# Patient Record
Sex: FEMALE | Race: WHITE | NOT HISPANIC OR LATINO
[De-identification: names, ages, dates, MRNs, and addresses within clinical notes are randomized per-mention and may not be internally consistent; named-entity substitution may affect disease eponyms.]

---

## 2024-04-23 ENCOUNTER — APPOINTMENT (OUTPATIENT)
Dept: ANTEPARTUM | Facility: CLINIC | Age: 34
End: 2024-04-23
Payer: COMMERCIAL

## 2024-04-23 ENCOUNTER — ASOB RESULT (OUTPATIENT)
Age: 34
End: 2024-04-23

## 2024-04-23 PROCEDURE — 76813 OB US NUCHAL MEAS 1 GEST: CPT

## 2024-04-23 PROCEDURE — 93976 VASCULAR STUDY: CPT

## 2024-04-23 PROCEDURE — 36415 COLL VENOUS BLD VENIPUNCTURE: CPT

## 2024-04-30 PROBLEM — Z00.00 ENCOUNTER FOR PREVENTIVE HEALTH EXAMINATION: Status: ACTIVE | Noted: 2024-04-30

## 2024-05-21 ENCOUNTER — APPOINTMENT (OUTPATIENT)
Dept: ANTEPARTUM | Facility: CLINIC | Age: 34
End: 2024-05-21
Payer: COMMERCIAL

## 2024-05-21 ENCOUNTER — ASOB RESULT (OUTPATIENT)
Age: 34
End: 2024-05-21

## 2024-05-21 PROCEDURE — 76817 TRANSVAGINAL US OBSTETRIC: CPT

## 2024-05-21 PROCEDURE — 76805 OB US >/= 14 WKS SNGL FETUS: CPT

## 2024-06-24 ENCOUNTER — ASOB RESULT (OUTPATIENT)
Age: 34
End: 2024-06-24

## 2024-06-24 ENCOUNTER — APPOINTMENT (OUTPATIENT)
Dept: ANTEPARTUM | Facility: CLINIC | Age: 34
End: 2024-06-24
Payer: COMMERCIAL

## 2024-06-24 PROCEDURE — 76811 OB US DETAILED SNGL FETUS: CPT

## 2024-06-24 PROCEDURE — 76817 TRANSVAGINAL US OBSTETRIC: CPT

## 2024-08-01 ENCOUNTER — OUTPATIENT (OUTPATIENT)
Dept: OUTPATIENT SERVICES | Facility: HOSPITAL | Age: 34
LOS: 1 days | End: 2024-08-01
Payer: COMMERCIAL

## 2024-08-01 VITALS
OXYGEN SATURATION: 94 % | SYSTOLIC BLOOD PRESSURE: 121 MMHG | DIASTOLIC BLOOD PRESSURE: 67 MMHG | HEART RATE: 101 BPM | TEMPERATURE: 99 F | RESPIRATION RATE: 19 BRPM

## 2024-08-01 DIAGNOSIS — O26.899 OTHER SPECIFIED PREGNANCY RELATED CONDITIONS, UNSPECIFIED TRIMESTER: ICD-10-CM

## 2024-08-01 PROCEDURE — 93005 ELECTROCARDIOGRAM TRACING: CPT

## 2024-08-01 PROCEDURE — 76818 FETAL BIOPHYS PROFILE W/NST: CPT

## 2024-08-01 PROCEDURE — 93010 ELECTROCARDIOGRAM REPORT: CPT

## 2024-08-01 PROCEDURE — 99214 OFFICE O/P EST MOD 30 MIN: CPT

## 2024-08-01 PROCEDURE — 59025 FETAL NON-STRESS TEST: CPT

## 2024-08-01 RX ORDER — CRANBERRY FRUIT EXTRACT 650 MG
1 CAPSULE ORAL
Refills: 0 | DISCHARGE

## 2024-08-01 NOTE — OB RN TRIAGE NOTE - FALL HARM RISK - UNIVERSAL INTERVENTIONS
Bed in lowest position, wheels locked, appropriate side rails in place/Call bell, personal items and telephone in reach/Instruct patient to call for assistance before getting out of bed or chair/Non-slip footwear when patient is out of bed/[place] to call system/Physically safe environment - no spills, clutter or unnecessary equipment/Purposeful Proactive Rounding/Room/bathroom lighting operational, light cord in reach

## 2024-08-01 NOTE — OB PROVIDER TRIAGE NOTE - HISTORY OF PRESENT ILLNESS
Patient is a  33y  at 26w5d presenting for RLQ/R pelvic pain. Per patient, the pain is intermittent, it began at 2100 last night     She denies toxic complaints such as headache, scotoma, CP, SOB, RUQ/epigastric pain. - LOF - VB - CTX +FM    Ante: Spontaneous pregnancy. NIPT and anatomy scan wnl. Passed GCT. Denies elevated BP in pregnancy.  EFW   GBS     OBHX:  G1 -   GynHX: denies fibroids, ovarian cysts, abnormal pap smear, STI/herpes.   MedHX: denies  Surghx: denies  Medications: denies  Allergies: NKDA    Physical Exam:  T(C): 37.1 (24 @ 12:00), Max: 37.1 (24 @ 12:00)  HR: 101 (24 @ 12:00) (101 - 101)  BP: 121/67 (24 @ 12:00) (121/67 - 121/67)  RR: 19 (24 @ 12:00) (19 - 19)  SpO2: 94% (24 @ 12:00) (94% - 94%)    General: NAD  Pulm: no increased WOB  Abdomen: soft, gravid, nontender  Extremities: wnl   TAUS: Cephalic. BPP 8/8. KHANH [ ]. Placenta [ ].   VE:     EFM: [ ] bpm, mod variability, + accels, - decels; reactive and reassuring  [place]: no ctx        A/p:    - Fetal status is reassuring given BPP 8/8, appropriate KHANH, reactive and reassuring NST  - Ultrasound attached in chart  - Labor unlikely given no CTX on monitor, no pain, cervical exam of [ ]   - Discharged with strict return precautions    Stacy Fox, PGY1  Discussed with , PGY and , Attending    Patient is a  33y  at 26w5d presenting for RLQ/R pelvic pain. Per patient, the pain is intermittent, it began at 2100 last night when she stood up suddenly. It was a 6/10 sharp pain which subsided to a 4/10. She then woke up at 0200 from the same 6/10 sharp pain which subsided after an hour, and she last felt the pain today when she stood up quickly on the train. Currently she has a 2/10 pain in the location. Denies headache, vision changes, HA, CP, SOB, RUQ/epigastric pain, N/V, and fevers. Endorses FM, denies LOF, VB, CTX.     Ante: IVF pregnancy with own egg. NIPT and anatomy scan wnl. Has not taken GCT. Denies elevated BP in pregnancy.   g on      OBHX:  G1 - current   GynHX: denies fibroids, ovarian cysts, abnormal pap smear, STI/herpes.   MedHX: Patient previously told she has abnormal heart rhythm however was never diagnosed.  Surghx: denies  Medications: denies  Allergies: NKDA    Physical Exam:  T(C): 37.1 (24 @ 12:00), Max: 37.1 (24 @ 12:00)  HR: 101 (24 @ 12:00) (101 - 101)  BP: 121/67 (24 @ 12:00) (121/67 - 121/67)  RR: 19 (24 @ 12:00) (19 - 19)  SpO2: 94% (24 @ 12:00) (94% - 94%)    General: NAD  Pulm: no increased WOB  Abdomen: soft, gravid, nontender  Extremities: wnl   TAUS: Cephalic. modified BPP: rigorous fetal movement, , MVP 7   TVUS: cervical length 4.17  Speculum: cervix closed, white physiologic discharge     EFM: [ ] bpm, mod variability, + accels, - decels; reactive and reassuring  [place]: no ctx        A/p:    - Fetal status is reassuring given BPP 8/8, appropriate KHANH, reactive and reassuring NST  - Ultrasound attached in chart  - Labor unlikely given no CTX on monitor, no pain, cervical exam of [ ]   - Discharged with strict return precautions    Stacy Fox, PGY1  Discussed with , PGY and , Attending    Patient is a  33y  at 26w5d presenting for RLQ/R pelvic pain. Per patient, the pain is intermittent, it began at 2100 last night when she stood up suddenly. It was a 6/10 sharp pain which subsided to a 4/10. She then woke up at 0200 from the same 6/10 sharp pain which subsided after an hour, and she last felt the pain today when she stood up quickly on the train. Currently she has a 2/10 pain/discomfort in the location. Denies headache, vision changes, HA, CP, SOB, RUQ/epigastric pain, N/V, and fevers. Endorses FM, denies LOF, VB, CTX.     Ante: IVF pregnancy with own egg. NIPT and anatomy scan wnl. Has not taken GCT. Denies elevated BP in pregnancy.   g on      OBHX:  G1 - current   GynHX: denies fibroids, ovarian cysts, abnormal pap smear, STI/herpes.   MedHX: Patient previously told she has abnormal heart rhythm however was never diagnosed.  Surghx: denies  Medications: denies  Allergies: NKDA    Physical Exam:  T(C): 37.1 (24 @ 12:00), Max: 37.1 (24 @ 12:00)  HR: 101 (24 @ 12:00) (101 - 101)  BP: 121/67 (24 @ 12:00) (121/67 - 121/67)  RR: 19 (24 @ 12:00) (19 - 19)  SpO2: 94% (24 @ 12:00) (94% - 94%)    General: NAD, patient resting comfortably in bed  Pulm: no increased WOB  Abdomen: soft, gravid, nontender, no rebound tenderness appreciated, no guarding. Palpation to the area of concern illicited no tenderness or pain for patient.   Extremities: wnl  TAUS: Cephalic. modified BPP: rigorous fetal movement, , MVP 7   TVUS: cervical length 4.17  Speculum: cervix closed, white physiologic discharge   EFM: 140 bpm, mod variability, + accels, - decels; reactive and reassuring  [place]: no ctx        A/p: 33y  at 26w5d presenting for RLQ/R pelvic pain likely round ligament pain, to be discharged home.    - Given patient is afebrile, without rebound tenderness or guarding on palpation to the abdomen, low suspicion for acute appendicitis at this time. Patient also denies fevers and nausea/vomiting at home.  - Patient denies toxic sx of PEC and does not have epigastric pain/RUQ pain on physical examination, patient is also normotensive, unlikely that her symptoms are in the setting of PEC.  - Patient states in the last two weeks she had significant weight gain to the abdomen; she also has exacerbation of symptoms upon standing supine quickly and resolves with rest. This is likely in the setting of round ligament pain.  - Fetal status reassuring on modified BPP, appropriate DVP and reactive/reassuring NST.  - Ultrasound attached in chart  - Patient to schedule a follow up appointment in 1 week with Dr. Younger  - Discharged with strict return precautions    Stacy Fox, PGY1  Discussed with Dr. Bansal, PGY3 and Dr. Younger, Attending

## 2024-08-02 DIAGNOSIS — O09.812 SUPERVISION OF PREGNANCY RESULTING FROM ASSISTED REPRODUCTIVE TECHNOLOGY, SECOND TRIMESTER: ICD-10-CM

## 2024-08-02 DIAGNOSIS — O09.512 SUPERVISION OF ELDERLY PRIMIGRAVIDA, SECOND TRIMESTER: ICD-10-CM

## 2024-08-02 DIAGNOSIS — Z3A.27 27 WEEKS GESTATION OF PREGNANCY: ICD-10-CM

## 2024-08-02 DIAGNOSIS — R10.2 PELVIC AND PERINEAL PAIN: ICD-10-CM

## 2024-08-02 DIAGNOSIS — O26.892 OTHER SPECIFIED PREGNANCY RELATED CONDITIONS, SECOND TRIMESTER: ICD-10-CM

## 2024-09-03 ENCOUNTER — ASOB RESULT (OUTPATIENT)
Age: 34
End: 2024-09-03

## 2024-09-03 ENCOUNTER — APPOINTMENT (OUTPATIENT)
Dept: ANTEPARTUM | Facility: CLINIC | Age: 34
End: 2024-09-03
Payer: COMMERCIAL

## 2024-09-03 PROCEDURE — 76819 FETAL BIOPHYS PROFIL W/O NST: CPT

## 2024-09-03 PROCEDURE — 76820 UMBILICAL ARTERY ECHO: CPT | Mod: 59

## 2024-09-03 PROCEDURE — 76816 OB US FOLLOW-UP PER FETUS: CPT

## 2024-09-30 ENCOUNTER — OUTPATIENT (OUTPATIENT)
Dept: OUTPATIENT SERVICES | Facility: HOSPITAL | Age: 34
LOS: 1 days | End: 2024-09-30
Payer: COMMERCIAL

## 2024-09-30 VITALS
RESPIRATION RATE: 18 BRPM | DIASTOLIC BLOOD PRESSURE: 94 MMHG | HEART RATE: 80 BPM | TEMPERATURE: 98 F | SYSTOLIC BLOOD PRESSURE: 132 MMHG

## 2024-09-30 DIAGNOSIS — O26.899 OTHER SPECIFIED PREGNANCY RELATED CONDITIONS, UNSPECIFIED TRIMESTER: ICD-10-CM

## 2024-09-30 LAB
ALBUMIN SERPL ELPH-MCNC: 3.4 G/DL — SIGNIFICANT CHANGE UP (ref 3.3–5)
ALP SERPL-CCNC: 249 U/L — HIGH (ref 40–120)
ALT FLD-CCNC: 12 U/L — SIGNIFICANT CHANGE UP (ref 10–45)
ANION GAP SERPL CALC-SCNC: 9 MMOL/L — SIGNIFICANT CHANGE UP (ref 5–17)
AST SERPL-CCNC: 20 U/L — SIGNIFICANT CHANGE UP (ref 10–40)
BASOPHILS # BLD AUTO: 0.03 K/UL — SIGNIFICANT CHANGE UP (ref 0–0.2)
BASOPHILS NFR BLD AUTO: 0.3 % — SIGNIFICANT CHANGE UP (ref 0–2)
BILIRUB SERPL-MCNC: 0.2 MG/DL — SIGNIFICANT CHANGE UP (ref 0.2–1.2)
BUN SERPL-MCNC: 6 MG/DL — LOW (ref 7–23)
CALCIUM SERPL-MCNC: 9.2 MG/DL — SIGNIFICANT CHANGE UP (ref 8.4–10.5)
CHLORIDE SERPL-SCNC: 107 MMOL/L — SIGNIFICANT CHANGE UP (ref 96–108)
CO2 SERPL-SCNC: 22 MMOL/L — SIGNIFICANT CHANGE UP (ref 22–31)
CREAT ?TM UR-MCNC: 26 MG/DL — SIGNIFICANT CHANGE UP
CREAT SERPL-MCNC: 0.56 MG/DL — SIGNIFICANT CHANGE UP (ref 0.5–1.3)
EGFR: 121 ML/MIN/1.73M2 — SIGNIFICANT CHANGE UP
EOSINOPHIL # BLD AUTO: 0.05 K/UL — SIGNIFICANT CHANGE UP (ref 0–0.5)
EOSINOPHIL NFR BLD AUTO: 0.5 % — SIGNIFICANT CHANGE UP (ref 0–6)
FIBRINOGEN PPP-MCNC: 531 MG/DL — HIGH (ref 200–445)
GLUCOSE SERPL-MCNC: 91 MG/DL — SIGNIFICANT CHANGE UP (ref 70–99)
HCT VFR BLD CALC: 32.5 % — LOW (ref 34.5–45)
HGB BLD-MCNC: 11.2 G/DL — LOW (ref 11.5–15.5)
IMM GRANULOCYTES NFR BLD AUTO: 1.2 % — HIGH (ref 0–0.9)
LDH SERPL L TO P-CCNC: 169 U/L — SIGNIFICANT CHANGE UP (ref 50–242)
LYMPHOCYTES # BLD AUTO: 2.18 K/UL — SIGNIFICANT CHANGE UP (ref 1–3.3)
LYMPHOCYTES # BLD AUTO: 20.7 % — SIGNIFICANT CHANGE UP (ref 13–44)
MCHC RBC-ENTMCNC: 32.4 PG — SIGNIFICANT CHANGE UP (ref 27–34)
MCHC RBC-ENTMCNC: 34.5 GM/DL — SIGNIFICANT CHANGE UP (ref 32–36)
MCV RBC AUTO: 93.9 FL — SIGNIFICANT CHANGE UP (ref 80–100)
MONOCYTES # BLD AUTO: 0.8 K/UL — SIGNIFICANT CHANGE UP (ref 0–0.9)
MONOCYTES NFR BLD AUTO: 7.6 % — SIGNIFICANT CHANGE UP (ref 2–14)
NEUTROPHILS # BLD AUTO: 7.34 K/UL — SIGNIFICANT CHANGE UP (ref 1.8–7.4)
NEUTROPHILS NFR BLD AUTO: 69.7 % — SIGNIFICANT CHANGE UP (ref 43–77)
NRBC # BLD: 0 /100 WBCS — SIGNIFICANT CHANGE UP (ref 0–0)
PLATELET # BLD AUTO: 203 K/UL — SIGNIFICANT CHANGE UP (ref 150–400)
POTASSIUM SERPL-MCNC: 4.1 MMOL/L — SIGNIFICANT CHANGE UP (ref 3.5–5.3)
POTASSIUM SERPL-SCNC: 4.1 MMOL/L — SIGNIFICANT CHANGE UP (ref 3.5–5.3)
PROT ?TM UR-MCNC: 5 MG/DL — SIGNIFICANT CHANGE UP (ref 0–12)
PROT SERPL-MCNC: 6.9 G/DL — SIGNIFICANT CHANGE UP (ref 6–8.3)
PROT/CREAT UR-RTO: 0.2 RATIO — SIGNIFICANT CHANGE UP (ref 0–0.2)
RBC # BLD: 3.46 M/UL — LOW (ref 3.8–5.2)
RBC # FLD: 12.1 % — SIGNIFICANT CHANGE UP (ref 10.3–14.5)
SODIUM SERPL-SCNC: 138 MMOL/L — SIGNIFICANT CHANGE UP (ref 135–145)
URATE SERPL-MCNC: 5.3 MG/DL — SIGNIFICANT CHANGE UP (ref 2.5–7)
WBC # BLD: 10.53 K/UL — HIGH (ref 3.8–10.5)
WBC # FLD AUTO: 10.53 K/UL — HIGH (ref 3.8–10.5)

## 2024-09-30 PROCEDURE — 99214 OFFICE O/P EST MOD 30 MIN: CPT

## 2024-09-30 PROCEDURE — 84550 ASSAY OF BLOOD/URIC ACID: CPT

## 2024-09-30 PROCEDURE — 84156 ASSAY OF PROTEIN URINE: CPT

## 2024-09-30 PROCEDURE — 83615 LACTATE (LD) (LDH) ENZYME: CPT

## 2024-09-30 PROCEDURE — 82570 ASSAY OF URINE CREATININE: CPT

## 2024-09-30 PROCEDURE — 80053 COMPREHEN METABOLIC PANEL: CPT

## 2024-09-30 PROCEDURE — 99213 OFFICE O/P EST LOW 20 MIN: CPT

## 2024-09-30 PROCEDURE — 85025 COMPLETE CBC W/AUTO DIFF WBC: CPT

## 2024-09-30 PROCEDURE — 36415 COLL VENOUS BLD VENIPUNCTURE: CPT

## 2024-09-30 PROCEDURE — 85384 FIBRINOGEN ACTIVITY: CPT

## 2024-09-30 RX ORDER — ACETAMINOPHEN 325 MG
975 TABLET ORAL ONCE
Refills: 0 | Status: COMPLETED | OUTPATIENT
Start: 2024-09-30 | End: 2024-09-30

## 2024-09-30 RX ADMIN — Medication 975 MILLIGRAM(S): at 18:45

## 2024-09-30 NOTE — OB PROVIDER TRIAGE NOTE - HISTORY OF PRESENT ILLNESS
REINALDO [id number]  S: 34yFemale  @35.3w presents due to elevated BPs in office. States she was seen by Dr Younger where her BP was >140/90 x3 readings. She reports having other isolated elevated BPs throughout pregnancy. Reports +scatoma, "the floaters look like fireworks" x2 weeks. Also endorses worsening of edema in the hands and feet over the last few days.  Reports +FM, no LOF/VB/CTX. Denies h/a, RUQ/epigastric pain.     Ante: IVF own egg, nipt wnl, anatomy sono wnl, gct wnl, gbs unknown  EFW 3000 on leopolds  Pregnancy problems: elevated BPs as above    Ob: denies  GYN: denies  PMHx: denies  Surg: denies  Meds: PNV  No Known Allergies      PE  T(C): --  HR:   BP: 153/92  RR: --  SpO2: --  General: No apparent distress; Lying comfortably in bed  Pulmonary: No increased work of breathing  Abdomen: Soft; Nontender; Gravid  Extremities: Trace pedal edema bilaterally; No calf tenderness bilaterally    NST: pending  [place]: pending  TAUS: BPP 8/8, KHANH, Cephalic, anterior placenta       REINALDO [id number]  S: 34yFemale  @35.3w presents due to elevated BPs in office. States she was seen by Dr Younger where her BP was >140/90 x3 readings. She reports having other isolated elevated BPs throughout pregnancy. Reports +scatoma, "the floaters look like fireworks" x2 weeks. Also endorses worsening of edema in the hands and feet over the last few days.  Reports +FM, no LOF/VB/CTX. Denies h/a, RUQ/epigastric pain.     Ante: IVF own egg, nipt wnl, anatomy sono wnl, gct wnl, gbs unknown  EFW 3000 on leopolds  Pregnancy problems: elevated BPs as above    Ob: denies  GYN: denies  PMHx: denies  Surg: denies  Meds: PNV  No Known Allergies      PE  T(C): --  HR:   BP: 153/92  RR: --  SpO2: --  General: No apparent distress; Lying comfortably in bed  Pulmonary: No increased work of breathing  Abdomen: Soft; Nontender; Gravid  Extremities: Trace pedal edema bilaterally; No calf tenderness bilaterally    NST: pending  [place]: pending  TAUS: BPP 8/8, KHANH 15.24, Cephalic, anterior placenta       REINALDO [id number]  S: 34yFemale  @35.3w presents due to elevated BPs in office. States she was seen by Dr Younger where her BP was >140/90 x3 readings. She reports having other isolated elevated BPs throughout pregnancy. Reports +scatoma, "the floaters look like fireworks" x2 weeks. Also endorses worsening of edema in the hands and feet over the last few days.  Reports +FM, no LOF/VB/CTX. Denies h/a, RUQ/epigastric pain.     Ante: IVF own egg, nipt wnl, anatomy sono wnl, gct wnl, gbs unknown  EFW 3000 on leopolds  Pregnancy problems: elevated BPs as above    Ob: denies  GYN: denies  PMHx: denies  Surg: denies  Meds: PNV  No Known Allergies      PE  T(C): --  HR:   BP: 153/92  RR: --  SpO2: --  General: No apparent distress; Lying comfortably in bed  Pulmonary: No increased work of breathing  Abdomen: Soft; Nontender; Gravid  Extremities: +2 pedal edema bilaterally; No calf tenderness bilaterally    NST: 120bpm, moderate variability, +accels, no decels  [place]: rare CTX (not appreciated by patient)  TAUS: BPP 8/8, KHANH 15.24, Cephalic, anterior placenta       REINALDO [id number]  S: 34yFemale  @35.3w presents due to elevated BPs in office. States she was seen by Dr Younger where her BP was >140/90 x3 readings. She reports having other isolated elevated BPs throughout pregnancy. Reports intermittent floaters that look grey, does not seem like black spots, light or sparkles, and do not obstruct her eyesight. She states she had these floaters prior to pregnancy but has worsened in the last two weeks. She states currently does not have floaters. Also endorses worsening of edema in the hands and feet over the last few days. Reports +FM, no LOF/VB/CTX. Denies h/a, RUQ/epigastric pain.     Ante: IVF own egg, nipt wnl, anatomy sono wnl, gct wnl, gbs unknown  EFW 3000 on leopolds  Pregnancy problems: elevated BPs as above    Ob: denies  GYN: denies  PMHx: denies  Surg: denies  Meds: PNV  No Known Allergies      PE  HR: 85  BP: 140/97  SpO2: 99  General: No apparent distress; Lying comfortably in bed  Pulmonary: No increased work of breathing  Abdomen: Soft; Nontender; Gravid; no RUQ/epigastric pain.   Extremities: +2 pedal edema bilaterally; No calf tenderness bilaterally    NST: 120bpm, moderate variability, +accels, no decels  [place]: rare CTX (not appreciated by patient)  TAUS: BPP 8/8, KHANH 15.24, Cephalic, anterior placenta                          11.2   10.53 )-----------( 203      ( 30 Sep 2024 18:40 )             32.5       138  |  107  |  6[L]  ----------------------------<  91  4.1   |  22  |  0.56    Ca    9.2      30 Sep 2024 18:40    TPro  6.9  /  Alb  3.4  /  TBili  0.2  /  DBili  x   /  AST  20  /  ALT  12  /  AlkPhos  249[H]  -  P/C 0.2     A/p: 35yo  at 35w3d p/w elevated BP, dx with gHTN at this time  - Pt vision changes "floaters" present prior to pregnancy and do not appear to be scotomas; patient to continue monitoring symptoms at home, including HA, RUQ/epigastric pain, SOB, CP, vision changes. Labwork at this time does not rule in for PECwoSF, patient rules in for gestational HTN at this time and was given a 24h urine to be collected this week.   - Pt to  BP cuff and monitor BP 3x a day. Given instructions on return precautions for PEC and PTL.   - Fetal status is reassuring given BPP 8/8, appropriate KHANH, reactive and reassuring NST  - Discharged with strict return precautions    Stacy Fox, PGY1  Discussed with Dr. Weeks, PGY3 and Dr. Younger, Attending

## 2024-09-30 NOTE — OB PROVIDER TRIAGE NOTE - NSOBPROVIDERNOTE_OBGYN_ALL_OB_FT
A/P: 33 yo  @35.3w presents for r/o PEC  - Full PEC labs pending. First BP mild range, patient meets criteria for gHTN. Will follow up labs and reassess  visual disturbances. Consider delivery for PEC with SF if visual disturbances  - Fetal status overall reassuring on NST and BPP.     D/w Dr. Aren RAINEYC

## 2024-10-01 ENCOUNTER — APPOINTMENT (OUTPATIENT)
Dept: ANTEPARTUM | Facility: CLINIC | Age: 34
End: 2024-10-01

## 2024-10-03 DIAGNOSIS — Z3A.35 35 WEEKS GESTATION OF PREGNANCY: ICD-10-CM

## 2024-10-03 DIAGNOSIS — R60.0 LOCALIZED EDEMA: ICD-10-CM

## 2024-10-03 DIAGNOSIS — O26.893 OTHER SPECIFIED PREGNANCY RELATED CONDITIONS, THIRD TRIMESTER: ICD-10-CM

## 2024-10-03 DIAGNOSIS — O13.3 GESTATIONAL [PREGNANCY-INDUCED] HYPERTENSION WITHOUT SIGNIFICANT PROTEINURIA, THIRD TRIMESTER: ICD-10-CM

## 2024-10-12 ENCOUNTER — INPATIENT (INPATIENT)
Facility: HOSPITAL | Age: 34
LOS: 7 days | Discharge: HOME | End: 2024-10-20
Attending: OBSTETRICS & GYNECOLOGY | Admitting: OBSTETRICS & GYNECOLOGY
Payer: COMMERCIAL

## 2024-10-12 VITALS
DIASTOLIC BLOOD PRESSURE: 102 MMHG | TEMPERATURE: 99 F | SYSTOLIC BLOOD PRESSURE: 149 MMHG | WEIGHT: 235.01 LBS | HEART RATE: 100 BPM | RESPIRATION RATE: 17 BRPM | OXYGEN SATURATION: 100 % | HEIGHT: 68 IN

## 2024-10-12 DIAGNOSIS — D62 ACUTE POSTHEMORRHAGIC ANEMIA: ICD-10-CM

## 2024-10-12 DIAGNOSIS — Z3A.37 37 WEEKS GESTATION OF PREGNANCY: ICD-10-CM

## 2024-10-12 DIAGNOSIS — Z98.890 OTHER SPECIFIED POSTPROCEDURAL STATES: Chronic | ICD-10-CM

## 2024-10-12 RX ORDER — CITRIC ACID/SODIUM CITRATE 334-500MG
15 SOLUTION, ORAL ORAL EVERY 6 HOURS
Refills: 0 | Status: DISCONTINUED | OUTPATIENT
Start: 2024-10-12 | End: 2024-10-13

## 2024-10-12 RX ORDER — CHLORHEXIDINE GLUCONATE 40 MG/ML
1 SOLUTION TOPICAL DAILY
Refills: 0 | Status: DISCONTINUED | OUTPATIENT
Start: 2024-10-12 | End: 2024-10-13

## 2024-10-12 RX ORDER — OXYTOCIN IN D5W-0.2% SODIUM CL 15/250 ML
167 PLASTIC BAG, INJECTION (ML) INTRAVENOUS
Qty: 30 | Refills: 0 | Status: DISCONTINUED | OUTPATIENT
Start: 2024-10-12 | End: 2024-10-13

## 2024-10-12 RX ADMIN — Medication 125 MILLILITER(S): at 22:30

## 2024-10-12 NOTE — OB PROVIDER H&P - HISTORY OF PRESENT ILLNESS
Patient is a  33 y/o  at 37w0d presenting for induction of labor for preeclampsia without severe features. She denies loss of fluid, contractions, and vaginal bleeding. She denies headache, dizziness, vision changes, chest pain, shortness of breath, RUQ pain.    Ante: IVF, own egg pregnancy. NIPT and anatomy scan wnl. Passed GCT. Per patient, she began to have elevated pressures in third trimester of pregnancy. At 35 weeks' gestation, 24 hour urine protein was 303. She has been taking BPs at home; her range has been systolic 130s-140s. She also endorses hx of feeling palpitations in pregnancy; saw cardiology, Holter monitor was performed and was wnl. Per patient, cardiology has cleared her for vaginal delivery.  EFW 3800 by [place] GBS- per attending, did not perform due to her parity    OBHX:  G1 - Current  GynHX: denies fibroids, ovarian cysts, abnormal pap smear, STI/herpes.   MedHX: denies  Surghx: egg retrieval   Medications: PNV  Allergies: NKDA    Physical Exam:  T(C): 37 (10-24 @ 22:22), Max: 37 (10-12-24 @ 22:22)  HR: 100 (10-24 @ 22:22) (100 - 100)  BP: 149/102 (10--24 @ 22:22) (149/102 - 149/102)  RR: 17 (10--24 @ 22:22) (17 - 17)  SpO2: 100% (10-12-24 @ 22:22) (100% - 100%)    General: in no acute distress  Pulm: no increased WOB  Abdomen: soft, gravid, nontender  Extremities: wnl     TAUS: Cephalic  VE: 0.5/long/posterior    EFM: 125 bpm, mod variability, + accels, - decels; reactive and reassuring  [place]: no ctx seen on toco      Assessment and Plan  Patient is a 33 y/o  at 37w0d presenting for induction of labor for preeclampsia without severe features.   - Admit to L&D  - Consent signed for balloon, pitocin, and vaginal delivery; all risks/benefits/alternatives were discussed and patient agreed to above plan  - NPO  - IV fluids and labs ordered  -Full labs sent; f/u results  - Continuous EFM     Discussed with Dr. Alexis and attending Dr. Aren [place], PGY1

## 2024-10-12 NOTE — OB PROVIDER H&P - NSLOWPPHRISK_OBGYN_A_OB
No previous uterine incision/[place] Pregnancy/Less than or equal to 4 previous vaginal births/No known bleeding disorder/No history of postpartum hemorrhage/No other PPH risks indicated

## 2024-10-13 LAB
ALBUMIN SERPL ELPH-MCNC: 3.6 G/DL — SIGNIFICANT CHANGE UP (ref 3.3–5)
ALP SERPL-CCNC: 338 U/L — HIGH (ref 40–120)
ALT FLD-CCNC: 11 U/L — SIGNIFICANT CHANGE UP (ref 10–45)
ANION GAP SERPL CALC-SCNC: 12 MMOL/L — SIGNIFICANT CHANGE UP (ref 5–17)
AST SERPL-CCNC: 16 U/L — SIGNIFICANT CHANGE UP (ref 10–40)
BASOPHILS # BLD AUTO: 0.04 K/UL — SIGNIFICANT CHANGE UP (ref 0–0.2)
BASOPHILS NFR BLD AUTO: 0.4 % — SIGNIFICANT CHANGE UP (ref 0–2)
BILIRUB SERPL-MCNC: 0.3 MG/DL — SIGNIFICANT CHANGE UP (ref 0.2–1.2)
BLD GP AB SCN SERPL QL: NEGATIVE — SIGNIFICANT CHANGE UP
BUN SERPL-MCNC: 8 MG/DL — SIGNIFICANT CHANGE UP (ref 7–23)
CALCIUM SERPL-MCNC: 9.5 MG/DL — SIGNIFICANT CHANGE UP (ref 8.4–10.5)
CHLORIDE SERPL-SCNC: 102 MMOL/L — SIGNIFICANT CHANGE UP (ref 96–108)
CO2 SERPL-SCNC: 20 MMOL/L — LOW (ref 22–31)
CREAT ?TM UR-MCNC: 35 MG/DL — SIGNIFICANT CHANGE UP
CREAT SERPL-MCNC: 0.59 MG/DL — SIGNIFICANT CHANGE UP (ref 0.5–1.3)
EGFR: 120 ML/MIN/1.73M2 — SIGNIFICANT CHANGE UP
EOSINOPHIL # BLD AUTO: 0.06 K/UL — SIGNIFICANT CHANGE UP (ref 0–0.5)
EOSINOPHIL NFR BLD AUTO: 0.5 % — SIGNIFICANT CHANGE UP (ref 0–6)
FIBRINOGEN PPP-MCNC: 550 MG/DL — HIGH (ref 200–445)
GLUCOSE SERPL-MCNC: 92 MG/DL — SIGNIFICANT CHANGE UP (ref 70–99)
HCT VFR BLD CALC: 33.7 % — LOW (ref 34.5–45)
HGB BLD-MCNC: 11.4 G/DL — LOW (ref 11.5–15.5)
IMM GRANULOCYTES NFR BLD AUTO: 1.2 % — HIGH (ref 0–0.9)
LDH SERPL L TO P-CCNC: 197 U/L — SIGNIFICANT CHANGE UP (ref 50–242)
LYMPHOCYTES # BLD AUTO: 2.33 K/UL — SIGNIFICANT CHANGE UP (ref 1–3.3)
LYMPHOCYTES # BLD AUTO: 20.7 % — SIGNIFICANT CHANGE UP (ref 13–44)
MCHC RBC-ENTMCNC: 30.7 PG — SIGNIFICANT CHANGE UP (ref 27–34)
MCHC RBC-ENTMCNC: 33.8 GM/DL — SIGNIFICANT CHANGE UP (ref 32–36)
MCV RBC AUTO: 90.8 FL — SIGNIFICANT CHANGE UP (ref 80–100)
MONOCYTES # BLD AUTO: 0.88 K/UL — SIGNIFICANT CHANGE UP (ref 0–0.9)
MONOCYTES NFR BLD AUTO: 7.8 % — SIGNIFICANT CHANGE UP (ref 2–14)
NEUTROPHILS # BLD AUTO: 7.79 K/UL — HIGH (ref 1.8–7.4)
NEUTROPHILS NFR BLD AUTO: 69.4 % — SIGNIFICANT CHANGE UP (ref 43–77)
NRBC # BLD: 0 /100 WBCS — SIGNIFICANT CHANGE UP (ref 0–0)
PLATELET # BLD AUTO: 230 K/UL — SIGNIFICANT CHANGE UP (ref 150–400)
POTASSIUM SERPL-MCNC: 3.8 MMOL/L — SIGNIFICANT CHANGE UP (ref 3.5–5.3)
POTASSIUM SERPL-SCNC: 3.8 MMOL/L — SIGNIFICANT CHANGE UP (ref 3.5–5.3)
PROT ?TM UR-MCNC: 5 MG/DL — SIGNIFICANT CHANGE UP (ref 0–12)
PROT SERPL-MCNC: 7.1 G/DL — SIGNIFICANT CHANGE UP (ref 6–8.3)
PROT/CREAT UR-RTO: 0.1 RATIO — SIGNIFICANT CHANGE UP (ref 0–0.2)
RBC # BLD: 3.71 M/UL — LOW (ref 3.8–5.2)
RBC # FLD: 11.9 % — SIGNIFICANT CHANGE UP (ref 10.3–14.5)
RH IG SCN BLD-IMP: NEGATIVE — SIGNIFICANT CHANGE UP
RH IG SCN BLD-IMP: NEGATIVE — SIGNIFICANT CHANGE UP
SODIUM SERPL-SCNC: 134 MMOL/L — LOW (ref 135–145)
T PALLIDUM AB TITR SER: NEGATIVE — SIGNIFICANT CHANGE UP
URATE SERPL-MCNC: 5.8 MG/DL — SIGNIFICANT CHANGE UP (ref 2.5–7)
WBC # BLD: 11.23 K/UL — HIGH (ref 3.8–10.5)
WBC # FLD AUTO: 11.23 K/UL — HIGH (ref 3.8–10.5)

## 2024-10-13 DEVICE — ARISTA 3GR: Type: IMPLANTABLE DEVICE | Status: FUNCTIONAL

## 2024-10-13 RX ORDER — ONDANSETRON HYDROCHLORIDE 2 MG/ML
4 INJECTION, SOLUTION INTRAMUSCULAR; INTRAVENOUS EVERY 6 HOURS
Refills: 0 | Status: DISCONTINUED | OUTPATIENT
Start: 2024-10-13 | End: 2024-10-13

## 2024-10-13 RX ORDER — IBUPROFEN 200 MG
600 TABLET ORAL EVERY 6 HOURS
Refills: 0 | Status: COMPLETED | OUTPATIENT
Start: 2024-10-13 | End: 2025-09-11

## 2024-10-13 RX ORDER — FAMOTIDINE 10 MG/ML
20 INJECTION INTRAVENOUS ONCE
Refills: 0 | Status: COMPLETED | OUTPATIENT
Start: 2024-10-13 | End: 2024-10-13

## 2024-10-13 RX ORDER — DIPHENHYDRAMINE HCL 12.5MG/5ML
25 ELIXIR ORAL EVERY 6 HOURS
Refills: 0 | Status: DISCONTINUED | OUTPATIENT
Start: 2024-10-13 | End: 2024-10-20

## 2024-10-13 RX ORDER — ACETAMINOPHEN 500 MG
1000 TABLET ORAL ONCE
Refills: 0 | Status: DISCONTINUED | OUTPATIENT
Start: 2024-10-13 | End: 2024-10-20

## 2024-10-13 RX ORDER — ACETAMINOPHEN 500 MG
975 TABLET ORAL
Refills: 0 | Status: DISCONTINUED | OUTPATIENT
Start: 2024-10-13 | End: 2024-10-20

## 2024-10-13 RX ORDER — OXYTOCIN IN D5W-0.2% SODIUM CL 15/250 ML
PLASTIC BAG, INJECTION (ML) INTRAVENOUS
Qty: 30 | Refills: 0 | Status: DISCONTINUED | OUTPATIENT
Start: 2024-10-13 | End: 2024-10-13

## 2024-10-13 RX ORDER — CITRIC ACID/SODIUM CITRATE 334-500MG
30 SOLUTION, ORAL ORAL ONCE
Refills: 0 | Status: COMPLETED | OUTPATIENT
Start: 2024-10-13 | End: 2024-10-13

## 2024-10-13 RX ORDER — DEXAMETHASONE 1.5 MG 1.5 MG/1
4 TABLET ORAL EVERY 6 HOURS
Refills: 0 | Status: DISCONTINUED | OUTPATIENT
Start: 2024-10-13 | End: 2024-10-13

## 2024-10-13 RX ORDER — KETOROLAC TROMETHAMINE 30 MG/ML
30 INJECTION INTRAMUSCULAR; INTRAVENOUS EVERY 6 HOURS
Refills: 0 | Status: DISCONTINUED | OUTPATIENT
Start: 2024-10-13 | End: 2024-10-14

## 2024-10-13 RX ORDER — NALOXONE HYDROCHLORIDE 1 MG/ML
0.1 INJECTION, SOLUTION INTRAMUSCULAR; INTRAVENOUS; SUBCUTANEOUS
Refills: 0 | Status: DISCONTINUED | OUTPATIENT
Start: 2024-10-13 | End: 2024-10-13

## 2024-10-13 RX ORDER — MODIFIED LANOLIN
1 OINTMENT (GRAM) TOPICAL EVERY 6 HOURS
Refills: 0 | Status: DISCONTINUED | OUTPATIENT
Start: 2024-10-13 | End: 2024-10-20

## 2024-10-13 RX ORDER — FERROUS SULFATE 325(65) MG
325 TABLET ORAL EVERY 24 HOURS
Refills: 0 | Status: DISCONTINUED | OUTPATIENT
Start: 2024-10-13 | End: 2024-10-20

## 2024-10-13 RX ORDER — CEFAZOLIN SODIUM 1 G
3000 VIAL (EA) INJECTION ONCE
Refills: 0 | Status: COMPLETED | OUTPATIENT
Start: 2024-10-13 | End: 2024-10-13

## 2024-10-13 RX ORDER — OXYCODONE HYDROCHLORIDE 30 MG/1
5 TABLET ORAL
Refills: 0 | Status: DISCONTINUED | OUTPATIENT
Start: 2024-10-13 | End: 2024-10-20

## 2024-10-13 RX ORDER — OXYCODONE HYDROCHLORIDE 30 MG/1
5 TABLET ORAL ONCE
Refills: 0 | Status: DISCONTINUED | OUTPATIENT
Start: 2024-10-13 | End: 2024-10-20

## 2024-10-13 RX ORDER — ENOXAPARIN SODIUM 40MG/0.4ML
40 SYRINGE (ML) SUBCUTANEOUS EVERY 12 HOURS
Refills: 0 | Status: DISCONTINUED | OUTPATIENT
Start: 2024-10-14 | End: 2024-10-20

## 2024-10-13 RX ORDER — FENTANYL/BUPIVACAINE/NS/PF 2-1250MCG
250 SYRINGE (ML) EPIDURAL
Refills: 0 | Status: DISCONTINUED | OUTPATIENT
Start: 2024-10-13 | End: 2024-10-13

## 2024-10-13 RX ORDER — ONDANSETRON HYDROCHLORIDE 2 MG/ML
4 INJECTION, SOLUTION INTRAMUSCULAR; INTRAVENOUS ONCE
Refills: 0 | Status: COMPLETED | OUTPATIENT
Start: 2024-10-13 | End: 2024-10-13

## 2024-10-13 RX ORDER — OXYTOCIN IN D5W-0.2% SODIUM CL 15/250 ML
42 PLASTIC BAG, INJECTION (ML) INTRAVENOUS
Qty: 30 | Refills: 0 | Status: DISCONTINUED | OUTPATIENT
Start: 2024-10-13 | End: 2024-10-20

## 2024-10-13 RX ORDER — SIMETHICONE 80 MG/1
80 TABLET, CHEWABLE ORAL EVERY 4 HOURS
Refills: 0 | Status: DISCONTINUED | OUTPATIENT
Start: 2024-10-13 | End: 2024-10-20

## 2024-10-13 RX ORDER — CLOSTRIDIUM TETANI TOXOID ANTIGEN (FORMALDEHYDE INACTIVATED), CORYNEBACTERIUM DIPHTHERIAE TOXOID ANTIGEN (FORMALDEHYDE INACTIVATED), BORDETELLA PERTUSSIS TOXOID ANTIGEN (GLUTARALDEHYDE INACTIVATED), BORDETELLA PERTUSSIS FILAMENTOUS HEMAGGLUTININ ANTIGEN (FORMALDEHYDE INACTIVATED), BORDETELLA PERTUSSIS PERTACTIN ANTIGEN, AND BORDETELLA PERTUSSIS FIMBRIAE 2/3 ANTIGEN 5; 2; 2.5; 5; 3; 5 [LF]/.5ML; [LF]/.5ML; UG/.5ML; UG/.5ML; UG/.5ML; UG/.5ML
0.5 INJECTION, SUSPENSION INTRAMUSCULAR ONCE
Refills: 0 | Status: DISCONTINUED | OUTPATIENT
Start: 2024-10-13 | End: 2024-10-20

## 2024-10-13 RX ORDER — AZITHROMYCIN DIHYDRATE 200 MG/5ML
500 POWDER, FOR SUSPENSION ORAL ONCE
Refills: 0 | Status: DISCONTINUED | OUTPATIENT
Start: 2024-10-13 | End: 2024-10-13

## 2024-10-13 RX ORDER — MAGNESIUM HYDROXIDE 1200 MG/15ML
30 SUSPENSION ORAL
Refills: 0 | Status: DISCONTINUED | OUTPATIENT
Start: 2024-10-13 | End: 2024-10-20

## 2024-10-13 RX ADMIN — Medication 30 MILLILITER(S): at 20:32

## 2024-10-13 RX ADMIN — Medication 2 MILLIUNIT(S)/MIN: at 05:05

## 2024-10-13 RX ADMIN — ONDANSETRON HYDROCHLORIDE 4 MILLIGRAM(S): 2 INJECTION, SOLUTION INTRAMUSCULAR; INTRAVENOUS at 11:43

## 2024-10-13 RX ADMIN — Medication 125 MILLILITER(S): at 05:30

## 2024-10-13 RX ADMIN — Medication 125 MILLILITER(S): at 04:00

## 2024-10-13 RX ADMIN — Medication 200 MILLIGRAM(S): at 20:45

## 2024-10-13 RX ADMIN — FAMOTIDINE 20 MILLIGRAM(S): 10 INJECTION INTRAVENOUS at 20:40

## 2024-10-13 RX ADMIN — KETOROLAC TROMETHAMINE 30 MILLIGRAM(S): 30 INJECTION INTRAMUSCULAR; INTRAVENOUS at 23:15

## 2024-10-13 RX ADMIN — Medication 250 MILLILITER(S): at 04:45

## 2024-10-13 NOTE — OB PROVIDER LABOR PROGRESS NOTE - NS_OBIHIFHRDETAILS_OBGYN_ALL_OB_FT
baseline 120, moderate variability, +accels; -decels; Cat I tracing
baseline 140, moderate variability, +accels, -decels; Cat I tracing
baseline 135, moderate variability, +accels, -decels; Cat I tracing
baseline 135, moderate variability, +accels, -decels; Cat I tracing
baseline 140, moderate variability, +accels, +decels with pushing
baseline 135, moderate variability, +accels, -decels; Cat I tracing
baseline 140, moderate variability, +accels, +decels with contractions; Cat II tracing, overall reassuring given moderate variability and recovery after ctx
baseline 135, moderate variability, +accels, +decels with pushing however recovery in FH after contraction
125 bpm, mod variability, + accels, - decels

## 2024-10-13 NOTE — OB RN DELIVERY SUMMARY - NS_SEPSISRSKCALC_OBGYN_ALL_OB_FT
EOS calculated successfully. EOS Risk Factor: 0.5/1000 live births ([facility] national incidence); GA=37w1d; Temp=100; ROM=11.167; GBS='Unknown'; Antibiotics='No antibiotics or any antibiotics < 2 hrs prior to birth'

## 2024-10-13 NOTE — OB RN DELIVERY SUMMARY - NSSELHIDDEN_OBGYN_ALL_OB_FT
[NS_DeliveryAttending1_OBGYN_ALL_OB_FT:[id number]],[NS_DeliveryAssist1_OBGYN_ALL_OB_FT:[id number]]

## 2024-10-13 NOTE — OB PROVIDER DELIVERY SUMMARY - NSPROVIDERDELIVERYNOTE_OBGYN_ALL_OB_FT
viable male   uncomp gamaliel cs viable male   uncomp gamaliel cs    Vaginal prep with iodine and fetal pillow applied in room prior to OR  Pfannensteil skin incision  Bladder flap created  Low transverse hysterotomy  Cephalic male infant delivered atruamtically  Placenta delivered intact with 3VC  Hysterotomy closed in 1 layer with 1 Vicryl; left infero lateral extension repaired  Figures of eiht x2 thrown for hemostasis  Ezequiel applied  Fascia closed with 1 Vicryl  Subcutaneous tissue closed with 2-0 Plain gut  Skin closed with 3-0 Monocryl      IVF 1500

## 2024-10-13 NOTE — OB PROVIDER DELIVERY SUMMARY - NSSELHIDDEN_OBGYN_ALL_OB_FT
[NS_DeliveryAttending1_OBGYN_ALL_OB_FT:[id number]] [NS_DeliveryAttending1_OBGYN_ALL_OB_FT:[id number]],[NS_DeliveryAssist1_OBGYN_ALL_OB_FT:[id number]]

## 2024-10-13 NOTE — PRE-ANESTHESIA EVALUATION ADULT - NSANTHOSAYNRD_GEN_A_CORE
No. ARNO screening performed.  STOP BANG Legend: 0-2 = LOW Risk; 3-4 = INTERMEDIATE Risk; 5-8 = HIGH Risk

## 2024-10-13 NOTE — OB PROVIDER LABOR PROGRESS NOTE - NS_OBIHICONTRACTIONPATTERNDETAILS_OBGYN_ALL_OB_FT
ctx q2 minutes
ctx q2 minutes
no ctx
ctx [id number]
ctx q2 minutes
ctx q2 minutes
ctx q2-3minutes
ctx q2-4minutes
ctx q2-4minutes

## 2024-10-13 NOTE — OB PROVIDER LABOR PROGRESS NOTE - NS_SUBJECTIVE/OBJECTIVE_OBGYN_ALL_OB_FT
EFM reviewed  Epidural in situ  Pit on at 20mu
EFM reviewed  Fully and pushing
EFM reviewed  Pit on at 12mu  Epidural in situ
EFM reviewed.  Baseline rate is 115 bpm, mod john, + accels, - decels  No ctx seen on toco, uterine irritability noted  Cat I tracing  Plan to check balloon at 0400.
Patient re-examined; balloon out and VE 4/50/-3.  EFM reviewed.  Baseline rate is 140 bpm, mod john, + accels, - decels  Ctx q2-3 mins  Cat I tracing  Continue to monitor labor progression  Plan for AROM
Per attending SVE 10/100/+2  Epidural in situ  Pit on at 14mu
Per attending SVE 9cm  Epidural in situ  Pit on at 14mu
EFM reviewed  Patient fully and pushing  Attending in house
EFM reviewed  SVE 4/50/-3  Pt SROM at 10:30 for clear fluid
EFM reviewed  SVE: 7-8/80/-1  Pt more uncomfortable  Pit on at 14mu

## 2024-10-13 NOTE — OB PROVIDER LABOR PROGRESS NOTE - ASSESSMENT
Anticipate 
Continue to monitor  Tracing Cat I 
Continue to monitor  Tracing Cat I at this time
Continue to monitor  Tracing Cat I at this time
Patient pushing x3 hours  Attending at bedside.  Patient requesting to continue pushing   Tracing overall reassuring with moderate variability
Continue to monitor  Tracing Cat I
Tracing Cat II although overall reassuring given moderate variability  Anticipate   Attending in house
Balloon placed 80 to tension. Pitocin ordered. Continue to monitor.

## 2024-10-13 NOTE — OB PROVIDER DELIVERY SUMMARY - NSLOWPPHRISK_OBGYN_A_OB
No previous uterine incision/[place] Pregnancy/Less than or equal to 4 previous vaginal births/No known bleeding disorder/No history of postpartum hemorrhage/No other PPH risks indicated No previous uterine incision/[place] Pregnancy/Less than or equal to 4 previous vaginal births/No known bleeding disorder/No history of postpartum hemorrhage

## 2024-10-13 NOTE — OB RN INTRAOPERATIVE NOTE - NSSELHIDDEN_OBGYN_ALL_OB_FT
[NS_DeliveryAttending1_OBGYN_ALL_OB_FT:[id number]],[NS_DeliveryAssist1_OBGYN_ALL_OB_FT:[id number]],[NS_DeliveryRN_OBGYN_ALL_OB_FT:[id number]]

## 2024-10-14 LAB
ANISOCYTOSIS BLD QL: SLIGHT — SIGNIFICANT CHANGE UP
BASOPHILS # BLD AUTO: 0 K/UL — SIGNIFICANT CHANGE UP (ref 0–0.2)
BASOPHILS NFR BLD AUTO: 0 % — SIGNIFICANT CHANGE UP (ref 0–2)
EOSINOPHIL # BLD AUTO: 0 K/UL — SIGNIFICANT CHANGE UP (ref 0–0.5)
EOSINOPHIL NFR BLD AUTO: 0 % — SIGNIFICANT CHANGE UP (ref 0–6)
GIANT PLATELETS BLD QL SMEAR: PRESENT — SIGNIFICANT CHANGE UP
HCT VFR BLD CALC: 27.2 % — LOW (ref 34.5–45)
HGB BLD-MCNC: 9.2 G/DL — LOW (ref 11.5–15.5)
LYMPHOCYTES # BLD AUTO: 2.31 K/UL — SIGNIFICANT CHANGE UP (ref 1–3.3)
LYMPHOCYTES # BLD AUTO: 9.5 % — LOW (ref 13–44)
MANUAL SMEAR VERIFICATION: SIGNIFICANT CHANGE UP
MCHC RBC-ENTMCNC: 32.1 PG — SIGNIFICANT CHANGE UP (ref 27–34)
MCHC RBC-ENTMCNC: 33.8 GM/DL — SIGNIFICANT CHANGE UP (ref 32–36)
MCV RBC AUTO: 94.8 FL — SIGNIFICANT CHANGE UP (ref 80–100)
MICROCYTES BLD QL: SLIGHT — SIGNIFICANT CHANGE UP
MONOCYTES # BLD AUTO: 0.22 K/UL — SIGNIFICANT CHANGE UP (ref 0–0.9)
MONOCYTES NFR BLD AUTO: 0.9 % — LOW (ref 2–14)
NEUTROPHILS # BLD AUTO: 21.83 K/UL — HIGH (ref 1.8–7.4)
NEUTROPHILS NFR BLD AUTO: 89.6 % — HIGH (ref 43–77)
PLAT MORPH BLD: ABNORMAL
PLATELET # BLD AUTO: 193 K/UL — SIGNIFICANT CHANGE UP (ref 150–400)
POLYCHROMASIA BLD QL SMEAR: SLIGHT — SIGNIFICANT CHANGE UP
RBC # BLD: 2.87 M/UL — LOW (ref 3.8–5.2)
RBC # FLD: 12.5 % — SIGNIFICANT CHANGE UP (ref 10.3–14.5)
RBC BLD AUTO: ABNORMAL
SPHEROCYTES BLD QL SMEAR: SLIGHT — SIGNIFICANT CHANGE UP
WBC # BLD: 24.36 K/UL — HIGH (ref 3.8–10.5)
WBC # FLD AUTO: 24.36 K/UL — HIGH (ref 3.8–10.5)

## 2024-10-14 RX ORDER — IBUPROFEN 200 MG
600 TABLET ORAL EVERY 6 HOURS
Refills: 0 | Status: DISCONTINUED | OUTPATIENT
Start: 2024-10-14 | End: 2024-10-20

## 2024-10-14 RX ADMIN — Medication 975 MILLIGRAM(S): at 08:57

## 2024-10-14 RX ADMIN — Medication 975 MILLIGRAM(S): at 20:54

## 2024-10-14 RX ADMIN — Medication 600 MILLIGRAM(S): at 23:59

## 2024-10-14 RX ADMIN — KETOROLAC TROMETHAMINE 30 MILLIGRAM(S): 30 INJECTION INTRAMUSCULAR; INTRAVENOUS at 11:21

## 2024-10-14 RX ADMIN — Medication 40 MILLIGRAM(S): at 11:21

## 2024-10-14 RX ADMIN — Medication 325 MILLIGRAM(S): at 07:12

## 2024-10-14 RX ADMIN — KETOROLAC TROMETHAMINE 30 MILLIGRAM(S): 30 INJECTION INTRAMUSCULAR; INTRAVENOUS at 17:05

## 2024-10-14 RX ADMIN — Medication 40 MILLIGRAM(S): at 23:59

## 2024-10-14 RX ADMIN — KETOROLAC TROMETHAMINE 30 MILLIGRAM(S): 30 INJECTION INTRAMUSCULAR; INTRAVENOUS at 06:06

## 2024-10-14 RX ADMIN — Medication 975 MILLIGRAM(S): at 14:42

## 2024-10-14 RX ADMIN — KETOROLAC TROMETHAMINE 30 MILLIGRAM(S): 30 INJECTION INTRAMUSCULAR; INTRAVENOUS at 00:46

## 2024-10-14 RX ADMIN — Medication 975 MILLIGRAM(S): at 02:46

## 2024-10-14 NOTE — LACTATION INITIAL EVALUATION - NS LACT CON REASON FOR REQ
37.1 wk baby seen around 14 hrs of life and sleeping at this time. Mother states infant fed 15cc of formula about 30mins ago, and had baby in WBN overnight so she can rest. Mother reports baby  well after delivery and has been sleepy since. She also stated she tried to  earlier this morning and latch was comfortable but not as strong as feeding from last night, she states no pain during feeding session. Infant was burped and void diaper changed, infant resumed sleeping. Mother states she would like to breastfeed as much as possible but is open to formula use. Normal  behviour and breastfeeding basics, including milk production feedback system and cue-based infant feedings were explained. Encouraged continued STS and rooming-in, and for the parents to call for further assistance as needed. RN to perform latch check when applicable. Implications of early intro to artificial nipples and mixed feeding was discussed, as well as the supply and demand feedback system for milk production. Intake guidelines and size of  stomach also discussed, and benefits and technique to paced-feed demonstrated. Complete breastfeeding education provided to primip mom. All questions were answered, mother verbalized understanding of teaching and info given. Pt has a postpartum  for breastfeeding assistance at home and will be referred to tele-health./primaparous mom

## 2024-10-14 NOTE — LACTATION INITIAL EVALUATION - LACTATION INTERVENTIONS
initiate/review safe skin-to-skin/initiate/review hand expression/review techniques to increase milk supply/reviewed components of an effective feeding and at least 8 effective feedings per day required/reviewed importance of monitoring infant diapers, the breastfeeding log, and minimum output each day/reviewed risks of unnecessary formula supplementation/reviewed risks of artificial nipples/reviewed strategies to transition to breastfeeding only/reviewed benefits and recommendations for rooming in/reviewed feeding on demand/by cue at least 8 times a day

## 2024-10-15 RX ADMIN — Medication 40 MILLIGRAM(S): at 23:53

## 2024-10-15 RX ADMIN — Medication 975 MILLIGRAM(S): at 02:15

## 2024-10-15 RX ADMIN — Medication 975 MILLIGRAM(S): at 20:32

## 2024-10-15 RX ADMIN — Medication 600 MILLIGRAM(S): at 18:02

## 2024-10-15 RX ADMIN — Medication 975 MILLIGRAM(S): at 09:10

## 2024-10-15 RX ADMIN — Medication 325 MILLIGRAM(S): at 07:08

## 2024-10-15 RX ADMIN — Medication 975 MILLIGRAM(S): at 15:00

## 2024-10-15 RX ADMIN — Medication 600 MILLIGRAM(S): at 12:08

## 2024-10-15 RX ADMIN — Medication 600 MILLIGRAM(S): at 07:08

## 2024-10-15 RX ADMIN — Medication 40 MILLIGRAM(S): at 12:08

## 2024-10-15 RX ADMIN — Medication 600 MILLIGRAM(S): at 23:53

## 2024-10-16 RX ORDER — LABETALOL HCL 200 MG
200 TABLET ORAL EVERY 8 HOURS
Refills: 0 | Status: DISCONTINUED | OUTPATIENT
Start: 2024-10-16 | End: 2024-10-17

## 2024-10-16 RX ORDER — ACETAMINOPHEN 500 MG
3 TABLET ORAL
Qty: 0 | Refills: 0 | DISCHARGE
Start: 2024-10-16

## 2024-10-16 RX ORDER — LABETALOL HCL 200 MG
200 TABLET ORAL EVERY 12 HOURS
Refills: 0 | Status: DISCONTINUED | OUTPATIENT
Start: 2024-10-16 | End: 2024-10-16

## 2024-10-16 RX ORDER — IBUPROFEN 200 MG
1 TABLET ORAL
Qty: 0 | Refills: 0 | DISCHARGE
Start: 2024-10-16

## 2024-10-16 RX ADMIN — MAGNESIUM HYDROXIDE 30 MILLILITER(S): 1200 SUSPENSION ORAL at 06:38

## 2024-10-16 RX ADMIN — Medication 600 MILLIGRAM(S): at 18:17

## 2024-10-16 RX ADMIN — Medication 975 MILLIGRAM(S): at 08:50

## 2024-10-16 RX ADMIN — Medication 975 MILLIGRAM(S): at 14:23

## 2024-10-16 RX ADMIN — Medication 600 MILLIGRAM(S): at 11:52

## 2024-10-16 RX ADMIN — Medication 975 MILLIGRAM(S): at 22:00

## 2024-10-16 RX ADMIN — Medication 975 MILLIGRAM(S): at 21:03

## 2024-10-16 RX ADMIN — Medication 200 MILLIGRAM(S): at 09:56

## 2024-10-16 RX ADMIN — Medication 200 MILLIGRAM(S): at 22:20

## 2024-10-16 RX ADMIN — Medication 325 MILLIGRAM(S): at 06:38

## 2024-10-16 RX ADMIN — SIMETHICONE 80 MILLIGRAM(S): 80 TABLET, CHEWABLE ORAL at 06:38

## 2024-10-16 RX ADMIN — Medication 975 MILLIGRAM(S): at 01:56

## 2024-10-16 RX ADMIN — Medication 40 MILLIGRAM(S): at 11:52

## 2024-10-16 RX ADMIN — Medication 600 MILLIGRAM(S): at 06:38

## 2024-10-16 RX ADMIN — SIMETHICONE 80 MILLIGRAM(S): 80 TABLET, CHEWABLE ORAL at 18:16

## 2024-10-16 NOTE — DISCHARGE NOTE OB - CARE PROVIDERS DIRECT ADDRESSES
,DirectAddress_Unknown ,DirectAddress_Unknown,[email or web address]

## 2024-10-16 NOTE — DISCHARGE NOTE OB - HOSPITAL COURSE
Admitted for induction of labor due to PEC w/o SF.  C/b arrest of descent, delivered via primary  section.  Uncomplicated surgery and postoperative course.  Acute blood loss anemia noted on post-operative CBC.  Patient stable with normal vital signs.  No intervention necessary.  PEC labs wnl.  Normal to mild range BPs postpartum, asymptomatic. Admitted for induction of labor due to PEC w/o SF.  C/b arrest of descent, delivered via primary  section. Uncomplicated surgery. Acute blood loss anemia noted on post-operative CBC.  Patient stable with normal vital signs.  No intervention necessary. On POD2, patient with mild range blood pressures, started on labetalol. Nifedipine added on POD3. Blood pressures improved, now normal to low mild range.   PEC labs wnl.   Admitted for induction of labor due to PEC w/o SF.  C/b arrest of descent, delivered via primary  section. Uncomplicated surgery. Acute blood loss anemia noted on post-operative CBC.  Patient stable with normal vital signs.  No intervention necessary. On POD2, patient with mild range blood pressures, started on labetalol. Nifedipine added on POD3. Patient ultimately was treated with IV magnesium 10/18-10/19 for persistently elevated blood pressures requiring uptitration of medications and elevated liver function tests. Blood pressures improved, now normal to low mild range and liver function tests improved.   PEC labs wnl.   Admitted for induction of labor due to PEC w/o SF.  C/b arrest of descent, delivered via primary  section. Uncomplicated surgery. Acute blood loss anemia noted on post-operative CBC.  Patient stable with normal vital signs.  No intervention necessary. On POD2, patient with mild range blood pressures, started on labetalol. Nifedipine added on POD3. Patient ultimately was treated with IV magnesium 10/18-10/19 for persistently elevated blood pressures requiring uptitration of medications and elevated liver function tests. Blood pressures improved, now normal to low mild range and liver function tests improved. PEC labs wnl.  On POD6, blood pressure was optimized to normotensive to low mild range on labetalol 300 mg TID and nifedipine 60/30 mg qd. At time of discharge, she was clinically and hemodynamically stable for discharge. Admitted for induction of labor due to PEC w/o SF.  C/b arrest of descent, delivered via primary  section. Uncomplicated surgery. Acute blood loss anemia noted on post-operative CBC.  Patient stable with normal vital signs.  No intervention necessary. On POD2, patient with mild range blood pressures, started on labetalol. Nifedipine added on POD3. Patient ultimately was treated with IV magnesium 10/18-10/19 for persistently elevated blood pressures requiring uptitration of medications and elevated liver function tests. Blood pressures improved, now normal to low mild range and liver function tests improved. PEC labs wnl. On 10/19, Cardiology consulted with recommendation to continue Labetalol 300 mg TID and nifedipine 60/30 mg and to follow up with Dr. Dhaliwal outpatient. On POD6, blood pressure was optimized to normotensive to low mild range on labetalol 300 mg TID and nifedipine 60/30 mg qd. At time of discharge, she was clinically and hemodynamically stable for discharge. Patient will follow up with Dr. Dhaliwal outpatient. Admitted for induction of labor due to PEC w/o SF.  C/b arrest of descent, delivered via primary  section. Uncomplicated surgery. Acute blood loss anemia noted on post-operative CBC.  Patient stable with normal vital signs.  No intervention necessary. On POD2, patient with mild range blood pressures, started on labetalol. Nifedipine added on POD3. Patient ultimately was treated with IV magnesium 10/18-10/19 for persistently elevated blood pressures requiring uptitration of medications and elevated liver function tests. Blood pressures improved, now normal to low mild range and liver function tests improved. PEC labs wnl. On 10/19, Cardiology consulted with recommendation to continue Labetalol 300 mg TID and nifedipine 60/30 mg and to follow up with Dr. Dhaliwal outpatient. On POD6, blood pressure was optimized to normotensive to low mild range on labetalol 300 mg TID and nifedipine 60/30 mg qd. At time of discharge, she was clinically and hemodynamically stable for discharge. Patient will follow up with Dr. Dhaliwal outpatient.    Asymptomatic acute blood loss anemia, supplemented with PO Ferrous sulfate

## 2024-10-16 NOTE — DISCHARGE NOTE OB - CARE PLAN
1 Principal Discharge DX:	 delivery delivered  Assessment and plan of treatment:	 delivery, meeting all postoperative milestones.  Please follow-up with your OB doctor in 1 week for blood pressure check.  You can resume a regular diet at home and may continue your prenatal vitamins as directed.  Please place nothing in the vagina for 6 weeks (no tampons, sex, douching, tub baths, swimming pools, etc).  If you have severe headaches and/or vision changes, heavy bleeding, or chest pain, please call your provider or go to the nearest Emergency Department.  Please call your OB with any signs of symptoms of infection including fever > 100.4 degrees, severe pain, malodorous vaginal discharge or heavy bleeding requiring more than 1-2 pads/hour.  You can take Motrin 600mg orally every 6 hours and Tylenol 1000mg orally every 6 hours for pain as needed.  Secondary Diagnosis:	Preeclampsia, third trimester  Assessment and plan of treatment:	Monitor blood pressure twice daily.  Document blood pressures to review with obstetrician at follow-up appointment.  Call doctor for persistent systolic blood pressure (top number) equal or greater to 150 AND/OR diastolic blood pressure (bottom number) equal or above 100.      Return to hospital for systolic blood pressure (top number) equal to or greater than 160 AND/OR diastolic blood pressure (bottom number) equal to or greater than 110 OR any of the following symptoms: changes in vision, headache not relieved with Tylenol, severe abdominal pain, vomiting, increased vaginal bleeding, chest pain, or shortness of breath.  Secondary Diagnosis:	Acute postoperative anemia due to expected blood loss   Principal Discharge DX:	 delivery delivered  Assessment and plan of treatment:	 delivery, meeting all postoperative milestones.  Please follow-up with your OB doctor in 1 week for blood pressure check.  You can resume a regular diet at home and may continue your prenatal vitamins as directed.  Please place nothing in the vagina for 6 weeks (no tampons, sex, douching, tub baths, swimming pools, etc).  If you have severe headaches and/or vision changes, heavy bleeding, or chest pain, please call your provider or go to the nearest Emergency Department.  Please call your OB with any signs of symptoms of infection including fever > 100.4 degrees, severe pain, malodorous vaginal discharge or heavy bleeding requiring more than 1-2 pads/hour.  You can take Motrin 600mg orally every 6 hours and Tylenol 1000mg orally every 6 hours for pain as needed.  Secondary Diagnosis:	Preeclampsia, third trimester  Assessment and plan of treatment:	Monitor blood pressure twice daily.  Document blood pressures to review with obstetrician at follow-up appointment.  Call doctor for persistent systolic blood pressure (top number) equal or greater to 150 AND/OR diastolic blood pressure (bottom number) equal or above 100.    Hold parameters: If the blood pressure is lower than 110/60 (either one) skip that dose of medication and inform your doctor.  Return to hospital for systolic blood pressure (top number) equal to or greater than 160 AND/OR diastolic blood pressure (bottom number) equal to or greater than 110 OR any of the following symptoms: changes in vision, headache not relieved with Tylenol, severe abdominal pain, vomiting, increased vaginal bleeding, chest pain, or shortness of breath.  Secondary Diagnosis:	Acute postoperative anemia due to expected blood loss

## 2024-10-16 NOTE — DISCHARGE NOTE OB - FINANCIAL ASSISTANCE
[facility] provides services at a reduced cost to those who are determined to be eligible through [facility]’s financial assistance program. Information regarding [facility]’s financial assistance program can be found by going to [email or web address] or by calling [phone].

## 2024-10-16 NOTE — DISCHARGE NOTE OB - MEDICATION SUMMARY - MEDICATIONS TO TAKE
I will START or STAY ON the medications listed below when I get home from the hospital:    ibuprofen 600 mg oral tablet  -- 1 tab(s) by mouth every 6 hours  -- Indication: For Pain    acetaminophen 325 mg oral tablet  -- 3 tab(s) by mouth every 6 hours  -- Indication: For Pain   I will START or STAY ON the medications listed below when I get home from the hospital:    ibuprofen 600 mg oral tablet  -- 1 tab(s) by mouth every 6 hours  -- Indication: For Pain    acetaminophen 325 mg oral tablet  -- 3 tab(s) by mouth every 6 hours  -- Indication: For Pain    labetalol 300 mg oral tablet  -- 1 tab(s) by mouth every 8 hours  -- Indication: For blood pressure    NIFEdipine 30 mg oral tablet, extended release  -- 1 tab(s) by mouth every 24 hours  -- Indication: For blood pressure   I will START or STAY ON the medications listed below when I get home from the hospital:    blood pressure cuff  -- take blood pressure three times per day. Record blood pressures in a log and bring to first postpartum visit.  -- Indication: For Blood pressure management    ibuprofen 600 mg oral tablet  -- 1 tab(s) by mouth every 6 hours  -- Indication: For Pain    acetaminophen 325 mg oral tablet  -- 3 tab(s) by mouth every 6 hours  -- Indication: For Pain    labetalol 300 mg oral tablet  -- 1 tab(s) by mouth every 8 hours  -- Indication: For Antihypertensive    NIFEdipine 60 mg oral tablet, extended release  -- 1 tab(s) by mouth every 24 hours  -- Indication: For Antihypertensive    NIFEdipine 30 mg oral tablet, extended release  -- 1 tab(s) by mouth every 24 hours  -- Indication: For Antihypertensive    NIFEdipine 30 mg oral tablet, extended release  -- 1 tab(s) by mouth every 24 hours  -- Indication: For Antihypertensive

## 2024-10-16 NOTE — DISCHARGE NOTE OB - PLAN OF CARE
Monitor blood pressure twice daily.  Document blood pressures to review with obstetrician at follow-up appointment.  Call doctor for persistent systolic blood pressure (top number) equal or greater to 150 AND/OR diastolic blood pressure (bottom number) equal or above 100.      Return to hospital for systolic blood pressure (top number) equal to or greater than 160 AND/OR diastolic blood pressure (bottom number) equal to or greater than 110 OR any of the following symptoms: changes in vision, headache not relieved with Tylenol, severe abdominal pain, vomiting, increased vaginal bleeding, chest pain, or shortness of breath.  delivery, meeting all postoperative milestones.  Please follow-up with your OB doctor in 1 week for blood pressure check.  You can resume a regular diet at home and may continue your prenatal vitamins as directed.  Please place nothing in the vagina for 6 weeks (no tampons, sex, douching, tub baths, swimming pools, etc).  If you have severe headaches and/or vision changes, heavy bleeding, or chest pain, please call your provider or go to the nearest Emergency Department.  Please call your OB with any signs of symptoms of infection including fever > 100.4 degrees, severe pain, malodorous vaginal discharge or heavy bleeding requiring more than 1-2 pads/hour.  You can take Motrin 600mg orally every 6 hours and Tylenol 1000mg orally every 6 hours for pain as needed. Monitor blood pressure twice daily.  Document blood pressures to review with obstetrician at follow-up appointment.  Call doctor for persistent systolic blood pressure (top number) equal or greater to 150 AND/OR diastolic blood pressure (bottom number) equal or above 100.    Hold parameters: If the blood pressure is lower than 110/60 (either one) skip that dose of medication and inform your doctor.  Return to hospital for systolic blood pressure (top number) equal to or greater than 160 AND/OR diastolic blood pressure (bottom number) equal to or greater than 110 OR any of the following symptoms: changes in vision, headache not relieved with Tylenol, severe abdominal pain, vomiting, increased vaginal bleeding, chest pain, or shortness of breath.

## 2024-10-16 NOTE — DISCHARGE NOTE OB - PROVIDER TOKENS
PROVIDER:[TOKEN:[[id number]],FOLLOWUP:[1 week]] PROVIDER:[TOKEN:[[id number]],FOLLOWUP:[1 week]],PROVIDER:[TOKEN:[[id number]]]

## 2024-10-16 NOTE — DISCHARGE NOTE OB - CARE PROVIDER_API CALL
Shaliesh Younger  Obstetrics and Gynecology  [address] [place] [address]  Phone: [phone]  Fax: [phone]  Follow Up Time: 1 week   Shailesh Younger  Obstetrics and Gynecology  [address] [place] [address]  Phone: [phone]  Fax: [phone]  Follow Up Time: 1 week    Millicent Dhaliwal  Cardiovascular Disease  [address] [place] [address]  Phone: [phone]  Fax: [phone]  Follow Up Time:

## 2024-10-16 NOTE — DISCHARGE NOTE OB - PATIENT PORTAL LINK FT
You can access the FollowMyHealth Patient Portal offered by [facility] by registering at the following website: [email or web address]. By joining [email or web address] FollowMyHealth portal, you will also be able to view your health information using other applications (apps) compatible with our system.

## 2024-10-17 RX ORDER — LABETALOL HCL 200 MG
300 TABLET ORAL EVERY 8 HOURS
Refills: 0 | Status: DISCONTINUED | OUTPATIENT
Start: 2024-10-17 | End: 2024-10-20

## 2024-10-17 RX ORDER — NIFEDIPINE 90 MG
30 TABLET, EXTENDED RELEASE 24 HR ORAL EVERY 24 HOURS
Refills: 0 | Status: DISCONTINUED | OUTPATIENT
Start: 2024-10-17 | End: 2024-10-18

## 2024-10-17 RX ADMIN — Medication 975 MILLIGRAM(S): at 22:00

## 2024-10-17 RX ADMIN — Medication 40 MILLIGRAM(S): at 23:54

## 2024-10-17 RX ADMIN — Medication 30 MILLIGRAM(S): at 12:33

## 2024-10-17 RX ADMIN — Medication 325 MILLIGRAM(S): at 06:16

## 2024-10-17 RX ADMIN — Medication 975 MILLIGRAM(S): at 04:30

## 2024-10-17 RX ADMIN — Medication 975 MILLIGRAM(S): at 20:55

## 2024-10-17 RX ADMIN — Medication 600 MILLIGRAM(S): at 23:55

## 2024-10-17 RX ADMIN — Medication 300 MILLIGRAM(S): at 21:56

## 2024-10-17 RX ADMIN — Medication 600 MILLIGRAM(S): at 01:00

## 2024-10-17 RX ADMIN — Medication 600 MILLIGRAM(S): at 06:16

## 2024-10-17 RX ADMIN — Medication 300 MILLIGRAM(S): at 13:41

## 2024-10-17 RX ADMIN — Medication 975 MILLIGRAM(S): at 03:56

## 2024-10-17 RX ADMIN — Medication 600 MILLIGRAM(S): at 18:08

## 2024-10-17 RX ADMIN — Medication 40 MILLIGRAM(S): at 00:33

## 2024-10-17 RX ADMIN — Medication 300 MILLIGRAM(S): at 06:17

## 2024-10-17 RX ADMIN — Medication 600 MILLIGRAM(S): at 00:33

## 2024-10-18 LAB
ALBUMIN SERPL ELPH-MCNC: 3.7 G/DL — SIGNIFICANT CHANGE UP (ref 3.3–5)
ALP SERPL-CCNC: 202 U/L — HIGH (ref 40–120)
ALT FLD-CCNC: 96 U/L — HIGH (ref 10–45)
ANION GAP SERPL CALC-SCNC: 8 MMOL/L — SIGNIFICANT CHANGE UP (ref 5–17)
ANISOCYTOSIS BLD QL: SLIGHT — SIGNIFICANT CHANGE UP
AST SERPL-CCNC: 62 U/L — HIGH (ref 10–40)
BASOPHILS # BLD AUTO: 0 K/UL — SIGNIFICANT CHANGE UP (ref 0–0.2)
BASOPHILS NFR BLD AUTO: 0 % — SIGNIFICANT CHANGE UP (ref 0–2)
BILIRUB SERPL-MCNC: 0.2 MG/DL — SIGNIFICANT CHANGE UP (ref 0.2–1.2)
BUN SERPL-MCNC: 7 MG/DL — SIGNIFICANT CHANGE UP (ref 7–23)
CALCIUM SERPL-MCNC: 9.2 MG/DL — SIGNIFICANT CHANGE UP (ref 8.4–10.5)
CHLORIDE SERPL-SCNC: 105 MMOL/L — SIGNIFICANT CHANGE UP (ref 96–108)
CO2 SERPL-SCNC: 23 MMOL/L — SIGNIFICANT CHANGE UP (ref 22–31)
CREAT SERPL-MCNC: 0.57 MG/DL — SIGNIFICANT CHANGE UP (ref 0.5–1.3)
EGFR: 121 ML/MIN/1.73M2 — SIGNIFICANT CHANGE UP
EOSINOPHIL # BLD AUTO: 0.45 K/UL — SIGNIFICANT CHANGE UP (ref 0–0.5)
EOSINOPHIL NFR BLD AUTO: 3.5 % — SIGNIFICANT CHANGE UP (ref 0–6)
GIANT PLATELETS BLD QL SMEAR: PRESENT — SIGNIFICANT CHANGE UP
GLUCOSE SERPL-MCNC: 104 MG/DL — HIGH (ref 70–99)
HCT VFR BLD CALC: 27.6 % — LOW (ref 34.5–45)
HGB BLD-MCNC: 9.1 G/DL — LOW (ref 11.5–15.5)
HYPOCHROMIA BLD QL: SLIGHT — SIGNIFICANT CHANGE UP
LYMPHOCYTES # BLD AUTO: 1.69 K/UL — SIGNIFICANT CHANGE UP (ref 1–3.3)
LYMPHOCYTES # BLD AUTO: 13.2 % — SIGNIFICANT CHANGE UP (ref 13–44)
MACROCYTES BLD QL: SLIGHT — SIGNIFICANT CHANGE UP
MANUAL SMEAR VERIFICATION: SIGNIFICANT CHANGE UP
MCHC RBC-ENTMCNC: 30.8 PG — SIGNIFICANT CHANGE UP (ref 27–34)
MCHC RBC-ENTMCNC: 33 GM/DL — SIGNIFICANT CHANGE UP (ref 32–36)
MCV RBC AUTO: 93.6 FL — SIGNIFICANT CHANGE UP (ref 80–100)
METAMYELOCYTES # FLD: 0.9 % — HIGH (ref 0–0)
MICROCYTES BLD QL: SLIGHT — SIGNIFICANT CHANGE UP
MONOCYTES # BLD AUTO: 0.33 K/UL — SIGNIFICANT CHANGE UP (ref 0–0.9)
MONOCYTES NFR BLD AUTO: 2.6 % — SIGNIFICANT CHANGE UP (ref 2–14)
MYELOCYTES NFR BLD: 0.9 % — HIGH (ref 0–0)
NEUTROPHILS # BLD AUTO: 10.08 K/UL — HIGH (ref 1.8–7.4)
NEUTROPHILS NFR BLD AUTO: 78.9 % — HIGH (ref 43–77)
OVALOCYTES BLD QL SMEAR: SLIGHT — SIGNIFICANT CHANGE UP
PLAT MORPH BLD: ABNORMAL
PLATELET # BLD AUTO: 312 K/UL — SIGNIFICANT CHANGE UP (ref 150–400)
POIKILOCYTOSIS BLD QL AUTO: SLIGHT — SIGNIFICANT CHANGE UP
POLYCHROMASIA BLD QL SMEAR: SLIGHT — SIGNIFICANT CHANGE UP
POTASSIUM SERPL-MCNC: 4.4 MMOL/L — SIGNIFICANT CHANGE UP (ref 3.5–5.3)
POTASSIUM SERPL-SCNC: 4.4 MMOL/L — SIGNIFICANT CHANGE UP (ref 3.5–5.3)
PROT SERPL-MCNC: 7.2 G/DL — SIGNIFICANT CHANGE UP (ref 6–8.3)
RBC # BLD: 2.95 M/UL — LOW (ref 3.8–5.2)
RBC # FLD: 12.6 % — SIGNIFICANT CHANGE UP (ref 10.3–14.5)
RBC BLD AUTO: ABNORMAL
SODIUM SERPL-SCNC: 136 MMOL/L — SIGNIFICANT CHANGE UP (ref 135–145)
SPHEROCYTES BLD QL SMEAR: SLIGHT — SIGNIFICANT CHANGE UP
WBC # BLD: 12.78 K/UL — HIGH (ref 3.8–10.5)
WBC # FLD AUTO: 12.78 K/UL — HIGH (ref 3.8–10.5)

## 2024-10-18 PROCEDURE — 99222 1ST HOSP IP/OBS MODERATE 55: CPT

## 2024-10-18 RX ORDER — NIFEDIPINE 90 MG
30 TABLET, EXTENDED RELEASE 24 HR ORAL EVERY 24 HOURS
Refills: 0 | Status: DISCONTINUED | OUTPATIENT
Start: 2024-10-18 | End: 2024-10-20

## 2024-10-18 RX ORDER — NIFEDIPINE 90 MG
1 TABLET, EXTENDED RELEASE 24 HR ORAL
Qty: 0 | Refills: 0 | DISCHARGE
Start: 2024-10-18

## 2024-10-18 RX ORDER — MAGNESIUM SULFATE IN 0.9% NACL 2 G/50 ML
2 INTRAVENOUS SOLUTION, PIGGYBACK (ML) INTRAVENOUS
Qty: 40 | Refills: 0 | Status: DISCONTINUED | OUTPATIENT
Start: 2024-10-18 | End: 2024-10-20

## 2024-10-18 RX ORDER — MAGNESIUM SULFATE IN 0.9% NACL 2 G/50 ML
4 INTRAVENOUS SOLUTION, PIGGYBACK (ML) INTRAVENOUS ONCE
Refills: 0 | Status: COMPLETED | OUTPATIENT
Start: 2024-10-18 | End: 2024-10-18

## 2024-10-18 RX ORDER — NIFEDIPINE 90 MG
60 TABLET, EXTENDED RELEASE 24 HR ORAL EVERY 24 HOURS
Refills: 0 | Status: DISCONTINUED | OUTPATIENT
Start: 2024-10-18 | End: 2024-10-20

## 2024-10-18 RX ORDER — LABETALOL HCL 200 MG
1 TABLET ORAL
Qty: 0 | Refills: 0 | DISCHARGE
Start: 2024-10-18

## 2024-10-18 RX ADMIN — Medication 600 MILLIGRAM(S): at 18:12

## 2024-10-18 RX ADMIN — Medication 300 MILLIGRAM(S): at 21:41

## 2024-10-18 RX ADMIN — Medication 600 MILLIGRAM(S): at 11:50

## 2024-10-18 RX ADMIN — Medication 50 GM/HR: at 21:36

## 2024-10-18 RX ADMIN — Medication 40 MILLIGRAM(S): at 23:37

## 2024-10-18 RX ADMIN — Medication 975 MILLIGRAM(S): at 03:20

## 2024-10-18 RX ADMIN — Medication 300 GRAM(S): at 21:15

## 2024-10-18 RX ADMIN — Medication 975 MILLIGRAM(S): at 20:41

## 2024-10-18 RX ADMIN — Medication 300 MILLIGRAM(S): at 14:14

## 2024-10-18 RX ADMIN — Medication 60 MILLIGRAM(S): at 10:43

## 2024-10-18 RX ADMIN — Medication 300 MILLIGRAM(S): at 06:40

## 2024-10-18 RX ADMIN — Medication 30 MILLIGRAM(S): at 21:41

## 2024-10-18 RX ADMIN — Medication 600 MILLIGRAM(S): at 23:36

## 2024-10-18 RX ADMIN — Medication 975 MILLIGRAM(S): at 14:15

## 2024-10-18 RX ADMIN — Medication 600 MILLIGRAM(S): at 06:40

## 2024-10-18 RX ADMIN — Medication 975 MILLIGRAM(S): at 09:49

## 2024-10-18 RX ADMIN — Medication 40 MILLIGRAM(S): at 11:51

## 2024-10-18 RX ADMIN — Medication 325 MILLIGRAM(S): at 06:40

## 2024-10-18 NOTE — DIETITIAN INITIAL EVALUATION ADULT - HEIGHT FOR BMI (INCHES)
8 Cyclophosphamide Counseling:  I discussed with the patient the risks of cyclophosphamide including but not limited to hair loss, hormonal abnormalities, decreased fertility, abdominal pain, diarrhea, nausea and vomiting, bone marrow suppression and infection. The patient understands that monitoring is required while taking this medication.

## 2024-10-18 NOTE — DIETITIAN INITIAL EVALUATION ADULT - OTHER CALCULATIONS
IBW (140 pounds) used to calculate needs since pre-pregnancy weight above % of IBW (143%). Needs adjusted for breastfeeding, post-op healing.  Estimated energy needs: 8550-1571 kcal (25-30kcal/kg +500)  Estimated protein needs: 71g protein  Estimated fluid needs: 2226-2544mL (35-40mL/kg)

## 2024-10-18 NOTE — CONSULT NOTE ADULT - ATTENDING COMMENTS
33 yo lady s/p delivery via  10/13 w/ pre-eclampsia.    Assessment  1. Preeclampsia  Last /78    Plan  1. Increase nifedipine to 90mg PO QD, give additional 30mg PO QD today  2. Labetalol 300mg PO Q8H  3. SBP goal <140  4. Obtain one more TTE tomorrow  5. We will see the patient again over the weekend    During non face-to-face time, I reviewed relevant portions of the patient’s medical record. During face-to-face time, I took a relevant history and examined the patient. I also explained differential diagnoses, relevant cardiac diagnoses, workup, and management plan, which required a moderate level of medical decision making. I answered all questions related to the patient's medical conditions.     Meir Win M.D.  Attending Cardiologist  [facility]

## 2024-10-18 NOTE — DIETITIAN INITIAL EVALUATION ADULT - OTHER INFO
Patient seen at bedside for length of stay assessment. Patient  status post  on 10/13 @37w1d being monitored for pre-eclampsia. Current diet order: regular. Reports good appetite since delivery. Endorses nausea with vomiting in first trimester. Patient is doing triple feeding plan - breastfeeding/pumping/formula. Provided nutrition education discussing importance of adequate protein and calories, food sources of protein, adequate fluids, continuing with prenatal MVI, importance of adequate iron, food sources of iron. Provided handout "Breastfeeding Nutrition Therapy." Patient appreciative, verbalized understanding. Pre-pregnancy weight: 200 pounds. Dosing weight: 235 pounds. No edema documented. Denies nausea/vomiting/diarrhea/constipation. Labs: alk phos elevated. Meds: iron, simethicone.

## 2024-10-18 NOTE — CONSULT NOTE ADULT - SUBJECTIVE AND OBJECTIVE BOX
HPI:  Patient is a  33 y/o  at 37w0d presenting for induction of labor for preeclampsia without severe features. She denies loss of fluid, contractions, and vaginal bleeding. She denies headache, dizziness, vision changes, chest pain, shortness of breath, RUQ pain.    Ante: IVF, own egg pregnancy. NIPT and anatomy scan wnl. Passed GCT. Per patient, she began to have elevated pressures in third trimester of pregnancy. At 35 weeks' gestation, 24 hour urine protein was 303. She has been taking BPs at home; her range has been systolic 130s-140s. She also endorses hx of feeling palpitations in pregnancy; saw cardiology, Holter monitor was performed and was wnl. Per patient, cardiology has cleared her for vaginal delivery.  EFW 3800 by [facility] [facility]- per attending, did not perform due to her parity    OBHX:  G1 - Current  GynHX: denies fibroids, ovarian cysts, abnormal pap smear, STI/herpes.   MedHX: denies  Surghx: egg retrieval   Medications: PNV  Allergies: NKDA    Physical Exam:  T(C): 37 (10--24 @ 22:22), Max: 37 (10-24 @ 22:22)  HR: 100 (10--24 @ 22:22) (100 - 100)  BP: 149/102 (10--24 @ 22:22) (149/102 - 149/102)  RR: 17 (10--24 @ 22:22) (17 - 17)  SpO2: 100% (10-12-24 @ 22:22) (100% - 100%)    General: in no acute distress  Pulm: no increased WOB  Abdomen: soft, gravid, nontender  Extremities: wnl     TAUS: Cephalic  VE: 0.5/long/posterior    EFM: 125 bpm, mod variability, + accels, - decels; reactive and reassuring  [place]: no ctx seen on toco      Assessment and Plan  Patient is a 33 y/o  at 37w0d presenting for induction of labor for preeclampsia without severe features.   - Admit to L&D  - Consent signed for balloon, pitocin, and vaginal delivery; all risks/benefits/alternatives were discussed and patient agreed to above plan  - NPO  - IV fluids and labs ordered  -Full labs sent; f/u results  - Continuous EFM     Discussed with Dr. Alexis and attending Dr. Aren Washington, PGY1                   (12 Oct 2024 23:13)      ROS: A 10-point review of systems was otherwise negative.    PAST MEDICAL & SURGICAL HISTORY:  No pertinent past medical history      H/O removal of cyst          SOCIAL HISTORY:  FAMILY HISTORY:      ALLERGIES: 	  No Known Allergies            MEDICATIONS:  acetaminophen     Tablet .. 975 milliGRAM(s) Oral <User Schedule>  acetaminophen   IVPB .. 1000 milliGRAM(s) IV Intermittent once  diphenhydrAMINE 25 milliGRAM(s) Oral every 6 hours PRN  diphtheria/tetanus/pertussis (acellular) Vaccine (Adacel) 0.5 milliLiter(s) IntraMuscular once  enoxaparin Injectable 40 milliGRAM(s) SubCutaneous every 12 hours  ferrous    sulfate 325 milliGRAM(s) Oral every 24 hours  ibuprofen  Tablet. 600 milliGRAM(s) Oral every 6 hours  labetalol 300 milliGRAM(s) Oral every 8 hours  lactated ringers Bolus 500 milliLiter(s) IV Bolus once  lactated ringers. 1000 milliLiter(s) IV Continuous <Continuous>  lanolin Ointment 1 Application(s) Topical every 6 hours PRN  magnesium hydroxide Suspension 30 milliLiter(s) Oral two times a day PRN  NIFEdipine XL 60 milliGRAM(s) Oral every 24 hours  oxyCODONE    IR 5 milliGRAM(s) Oral every 3 hours PRN  oxyCODONE    IR 5 milliGRAM(s) Oral once PRN  oxytocin Infusion 42 milliUNIT(s)/Min IV Continuous <Continuous>  simethicone 80 milliGRAM(s) Chew every 4 hours PRN      HOME MEDICATIONS:  acetaminophen 325 mg oral tablet: 3 tab(s) orally every 6 hours  ibuprofen 600 mg oral tablet: 1 tab(s) orally every 6 hours  labetalol 300 mg oral tablet: 1 tab(s) orally every 8 hours  NIFEdipine 30 mg oral tablet, extended release: 1 tab(s) orally every 24 hours      PHYSICAL EXAM:      I/O Summary 24H      T(F): 97.3 (10-18-24 @ 13:56), Max: 98.6 (10-18-24 @ 06:00)  HR: 90 (10-18-24 @ 13:56) (80 - 93)  BP: 153/78 (10-18-24 @ 13:56) (133/86 - 153/78)  BP(mean): --  ABP: --  ABP(mean): --  RR: 16 (10-18-24 @ 13:56) (16 - 18)  SpO2: 99% (10-18-24 @ 13:56) (96% - 100%)  CVP(mm Hg): --    GEN: Awake, comfortable. NAD.   HEENT: NCAT, PERRL, EOMI. Mucosa moist. No JVD.   RESP: CTA b/l  CV: RRR, normal s1/s2. No m/r/g.  ABD: Soft, NTND. BS+  EXT: Warm. No edema, clubbing, or cyanosis.   NEURO: AAOx3. No focal deficits.      	  LABS:	 	    Cardiac Markers         Hgb trend:   WBC trend:           Serum Cr (eGFR) trend:     proBNP:   Lipid Profile:   HgA1c:   TSH:     TELEMETRY: 	    ECG:  	  RADIOLOGY:   ECHO:  STRESS:  CATH: HPI:  Patient is a  35 y/o  at 37w0d presenting for induction of labor for preeclampsia without severe features. She denies loss of fluid, contractions, and vaginal bleeding. She denies headache, dizziness, vision changes, chest pain, shortness of breath, RUQ pain.    Ante: IVF, own egg pregnancy. NIPT and anatomy scan wnl. Passed GCT. Per patient, she began to have elevated pressures in third trimester of pregnancy. At 35 weeks' gestation, 24 hour urine protein was 303. She has been taking BPs at home; her range has been systolic 130s-140s. She also endorses hx of feeling palpitations in pregnancy; saw cardiology, Holter monitor was performed and was wnl. Per patient, cardiology has cleared her for vaginal delivery.  EFW 3800 by [facility]- per attending, did not perform due to her parity    Cardiology consulted for management of pre-eclampsia without severe features.     OBHX:  G1 - Current  GynHX: denies fibroids, ovarian cysts, abnormal pap smear, STI/herpes.   MedHX: denies  Surghx: egg retrieval   Medications: PNV  Allergies: NKDA           (12 Oct 2024 23:13)  ROS: A 10-point review of systems was otherwise negative.    PAST MEDICAL & SURGICAL HISTORY:  No pertinent past medical history      H/O removal of cyst    SOCIAL HISTORY:  FAMILY HISTORY:      ALLERGIES: 	  No Known Allergies            MEDICATIONS:  acetaminophen     Tablet .. 975 milliGRAM(s) Oral <User Schedule>  acetaminophen   IVPB .. 1000 milliGRAM(s) IV Intermittent once  diphenhydrAMINE 25 milliGRAM(s) Oral every 6 hours PRN  diphtheria/tetanus/pertussis (acellular) Vaccine (Adacel) 0.5 milliLiter(s) IntraMuscular once  enoxaparin Injectable 40 milliGRAM(s) SubCutaneous every 12 hours  ferrous    sulfate 325 milliGRAM(s) Oral every 24 hours  ibuprofen  Tablet. 600 milliGRAM(s) Oral every 6 hours  labetalol 300 milliGRAM(s) Oral every 8 hours  lactated ringers Bolus 500 milliLiter(s) IV Bolus once  lactated ringers. 1000 milliLiter(s) IV Continuous <Continuous>  lanolin Ointment 1 Application(s) Topical every 6 hours PRN  magnesium hydroxide Suspension 30 milliLiter(s) Oral two times a day PRN  NIFEdipine XL 60 milliGRAM(s) Oral every 24 hours  oxyCODONE    IR 5 milliGRAM(s) Oral every 3 hours PRN  oxyCODONE    IR 5 milliGRAM(s) Oral once PRN  oxytocin Infusion 42 milliUNIT(s)/Min IV Continuous <Continuous>  simethicone 80 milliGRAM(s) Chew every 4 hours PRN      HOME MEDICATIONS:  acetaminophen 325 mg oral tablet: 3 tab(s) orally every 6 hours  ibuprofen 600 mg oral tablet: 1 tab(s) orally every 6 hours  labetalol 300 mg oral tablet: 1 tab(s) orally every 8 hours  NIFEdipine 30 mg oral tablet, extended release: 1 tab(s) orally every 24 hours      PHYSICAL EXAM:      I/O Summary 24H      T(F): 97.3 (10-18-24 @ 13:56), Max: 98.6 (10-18-24 @ 06:00)  HR: 90 (10-18-24 @ 13:56) (80 - 93)  BP: 153/78 (10-18-24 @ 13:56) (133/86 - 153/78)  BP(mean): --  ABP: --  ABP(mean): --  RR: 16 (10-18-24 @ 13:56) (16 - 18)  SpO2: 99% (10-18-24 @ 13:56) (96% - 100%)  CVP(mm Hg): --    GEN: Awake, comfortable. NAD.   HEENT: NCAT, PERRL, EOMI. Mucosa moist. No JVD.   RESP: CTA b/l  CV: RRR, normal s1/s2. No m/r/g.  ABD: Soft, NTND. BS+  EXT: Warm. No edema, clubbing, or cyanosis.   NEURO: AAOx3. No focal deficits.      	  LABS:	 	    Cardiac Markers         Hgb trend:   WBC trend:           Serum Cr (eGFR) trend:     proBNP:   Lipid Profile:   HgA1c:   TSH:     TELEMETRY: 	    ECG:  	  RADIOLOGY:   ECHO:  STRESS:  CATH: HPI:  Patient is a  35 y/o  at 37w0d presenting for induction of labor for preeclampsia without severe features. She denies loss of fluid, contractions, and vaginal bleeding. She denies headache, dizziness, vision changes, chest pain, shortness of breath, RUQ pain.    Ante: IVF, own egg pregnancy. NIPT and anatomy scan wnl. Passed GCT. Per patient, she began to have elevated pressures in third trimester of pregnancy. At 35 weeks' gestation, 24 hour urine protein was 303. She has been taking BPs at home; her range has been systolic 130s-140s. She also endorses hx of feeling palpitations in pregnancy; saw cardiology, Holter monitor was performed and was wnl. Per patient, cardiology has cleared her for vaginal delivery.  EFW 3800 by [facility]- per attending, did not perform due to her parity    Cardiology consulted for management of pre-eclampsia without severe features. Per patient, no vision changes, chest pain, SOB. Says her leg swelling has improved since her C section but still persists. No chronic pmhx or cardiac pmhx. During pregnancy, patient had ectopy and was placed on a ambulatory ECG monitoring, unremarkable per patient. Otherwise, no complaints, states that she feels anxious about her BP and has not been able to sleep as normal while at the hospital.    OBHX:  G1 - Current  GynHX: denies fibroids, ovarian cysts, abnormal pap smear, STI/herpes.   MedHX: denies  Surghx: egg retrieval   Medications: PNV  Allergies: NKDA           (12 Oct 2024 23:13)  ROS: A 10-point review of systems was otherwise negative.    PAST MEDICAL & SURGICAL HISTORY:  No pertinent past medical history      H/O removal of cyst    SOCIAL HISTORY:  FAMILY HISTORY:      ALLERGIES: 	  No Known Allergies            MEDICATIONS:  acetaminophen     Tablet .. 975 milliGRAM(s) Oral <User Schedule>  acetaminophen   IVPB .. 1000 milliGRAM(s) IV Intermittent once  diphenhydrAMINE 25 milliGRAM(s) Oral every 6 hours PRN  diphtheria/tetanus/pertussis (acellular) Vaccine (Adacel) 0.5 milliLiter(s) IntraMuscular once  enoxaparin Injectable 40 milliGRAM(s) SubCutaneous every 12 hours  ferrous    sulfate 325 milliGRAM(s) Oral every 24 hours  ibuprofen  Tablet. 600 milliGRAM(s) Oral every 6 hours  labetalol 300 milliGRAM(s) Oral every 8 hours  lactated ringers Bolus 500 milliLiter(s) IV Bolus once  lactated ringers. 1000 milliLiter(s) IV Continuous <Continuous>  lanolin Ointment 1 Application(s) Topical every 6 hours PRN  magnesium hydroxide Suspension 30 milliLiter(s) Oral two times a day PRN  NIFEdipine XL 60 milliGRAM(s) Oral every 24 hours  oxyCODONE    IR 5 milliGRAM(s) Oral every 3 hours PRN  oxyCODONE    IR 5 milliGRAM(s) Oral once PRN  oxytocin Infusion 42 milliUNIT(s)/Min IV Continuous <Continuous>  simethicone 80 milliGRAM(s) Chew every 4 hours PRN      HOME MEDICATIONS:  acetaminophen 325 mg oral tablet: 3 tab(s) orally every 6 hours  ibuprofen 600 mg oral tablet: 1 tab(s) orally every 6 hours  labetalol 300 mg oral tablet: 1 tab(s) orally every 8 hours  NIFEdipine 30 mg oral tablet, extended release: 1 tab(s) orally every 24 hours      PHYSICAL EXAM:  Constitutional: NAD, resting comfortably, sitting up in bed  Eyes: EOMI, MMM  Head: DENISE   Heart: RRR, no murmurs, gallops, or rubs  Lungs: CTAB, no wheezes, ronchi, or rales  Abdomen: distended, soft, with scar along the lower abdomen s/p c section   Extremities: 1+ pitting edema bl; warm, well perfused, no cyanosis or clubbing  Neuro: Anox3    I/O Summary 24H      T(F): 97.3 (10-18-24 @ 13:56), Max: 98.6 (10-18-24 @ 06:00)  HR: 90 (10-18-24 @ 13:56) (80 - 93)  BP: 153/78 (10-18-24 @ 13:56) (133/86 - 153/78)  BP(mean): --  ABP: --  ABP(mean): --  RR: 16 (10-18-24 @ 13:56) (16 - 18)  SpO2: 99% (10-18-24 @ 13:56) (96% - 100%)  CVP(mm Hg): --    GEN: Awake, comfortable. NAD.   HEENT: NCAT, PERRL, EOMI. Mucosa moist. No JVD.   RESP: CTA b/l  CV: RRR, normal s1/s2. No m/r/g.  ABD: Soft, NTND. BS+  EXT: Warm. No edema, clubbing, or cyanosis.   NEURO: AAOx3. No focal deficits.      	  LABS:	 	    Cardiac Markers         Hgb trend:   WBC trend:           Serum Cr (eGFR) trend:     proBNP:   Lipid Profile:   HgA1c:   TSH:     TELEMETRY: 	    ECG:  	  RADIOLOGY:   ECHO:  STRESS:  CATH: HPI:  Patient is a  33 y/o  at 37w0d presenting for induction of labor for preeclampsia without severe features. She denies loss of fluid, contractions, and vaginal bleeding. She denies headache, dizziness, vision changes, chest pain, shortness of breath, RUQ pain.  Cardiology consulted for management of pre-eclampsia without severe features. She has no cardiac history or medical conditions for which she takes medications. She denies current shortness of breath, chest pain, or vision changes. She noted some mild LE edema for the past month but with no significant change since admission. During pregnancy, patient had ectopy and was placed on a ambulatory ECG monitoring, unremarkable per patient. She also had an echocardiogram done at that time (1 month ago) which she was told was normal. Otherwise, no complaints, states that she feels anxious about her BP and has not been able to sleep as normal while at the hospital.    OBHX:  G1 - Current  GynHX: denies fibroids, ovarian cysts, abnormal pap smear, STI/herpes.   MedHX: denies  Surghx: egg retrieval   Medications: PNV  Allergies: NKDA     (12 Oct 2024 23:13)  ROS: A 10-point review of systems was otherwise negative.    PAST MEDICAL & SURGICAL HISTORY:  No pertinent past medical history  H/O removal of cyst    SOCIAL HISTORY: Non-contributory  FAMILY HISTORY: No fam hx CAD    ALLERGIES: 	  No Known Allergies      MEDICATIONS:  acetaminophen     Tablet .. 975 milliGRAM(s) Oral <User Schedule>  acetaminophen   IVPB .. 1000 milliGRAM(s) IV Intermittent once  diphenhydrAMINE 25 milliGRAM(s) Oral every 6 hours PRN  diphtheria/tetanus/pertussis (acellular) Vaccine (Adacel) 0.5 milliLiter(s) IntraMuscular once  enoxaparin Injectable 40 milliGRAM(s) SubCutaneous every 12 hours  ferrous    sulfate 325 milliGRAM(s) Oral every 24 hours  ibuprofen  Tablet. 600 milliGRAM(s) Oral every 6 hours  labetalol 300 milliGRAM(s) Oral every 8 hours  lactated ringers Bolus 500 milliLiter(s) IV Bolus once  lactated ringers. 1000 milliLiter(s) IV Continuous <Continuous>  lanolin Ointment 1 Application(s) Topical every 6 hours PRN  magnesium hydroxide Suspension 30 milliLiter(s) Oral two times a day PRN  NIFEdipine XL 60 milliGRAM(s) Oral every 24 hours  oxyCODONE    IR 5 milliGRAM(s) Oral every 3 hours PRN  oxyCODONE    IR 5 milliGRAM(s) Oral once PRN  oxytocin Infusion 42 milliUNIT(s)/Min IV Continuous <Continuous>  simethicone 80 milliGRAM(s) Chew every 4 hours PRN      HOME MEDICATIONS:  acetaminophen 325 mg oral tablet: 3 tab(s) orally every 6 hours  ibuprofen 600 mg oral tablet: 1 tab(s) orally every 6 hours  labetalol 300 mg oral tablet: 1 tab(s) orally every 8 hours  NIFEdipine 30 mg oral tablet, extended release: 1 tab(s) orally every 24 hours      PHYSICAL EXAM:  Constitutional: NAD, resting comfortably, sitting up in bed  Eyes: EOMI, MMM  Head: DENISE   Heart: RRR, no murmurs, gallops, or rubs  Lungs: CTAB, no wheezes, ronchi, or rales  Abdomen: distended, soft, with scar along the lower abdomen s/p c section   Extremities: 1+ pitting edema bl to mid shin; warm, well perfused, no cyanosis or clubbing  Neuro: Anox3    I/O Summary 24H      T(F): 97.3 (10-18-24 @ 13:56), Max: 98.6 (10-18-24 @ 06:00)  HR: 90 (10-18-24 @ 13:56) (80 - 93)  BP: 153/78 (10-18-24 @ 13:56) (133/86 - 153/78)  BP(mean): --  ABP: --  ABP(mean): --  RR: 16 (10-18-24 @ 13:56) (16 - 18)  SpO2: 99% (10-18-24 @ 13:56) (96% - 100%)  CVP(mm Hg): --    GEN: Awake, comfortable. NAD.   HEENT: NCAT, PERRL, EOMI. Mucosa moist. No JVD.   RESP: CTA b/l  CV: RRR, normal s1/s2. No m/r/g.  ABD: Soft, NTND. BS+  EXT: Warm. No edema, clubbing, or cyanosis.   NEURO: AAOx3. No focal deficits.      LABS:	 	    Cardiac Markers   Hgb trend:   WBC trend:      Serum Cr (eGFR) trend:

## 2024-10-18 NOTE — DIETITIAN INITIAL EVALUATION ADULT - PERTINENT MEDS FT
MEDICATIONS  (STANDING):  acetaminophen     Tablet .. 975 milliGRAM(s) Oral <User Schedule>  acetaminophen   IVPB .. 1000 milliGRAM(s) IV Intermittent once  diphtheria/tetanus/pertussis (acellular) Vaccine (Adacel) 0.5 milliLiter(s) IntraMuscular once  enoxaparin Injectable 40 milliGRAM(s) SubCutaneous every 12 hours  ferrous    sulfate 325 milliGRAM(s) Oral every 24 hours  ibuprofen  Tablet. 600 milliGRAM(s) Oral every 6 hours  labetalol 300 milliGRAM(s) Oral every 8 hours  lactated ringers Bolus 500 milliLiter(s) IV Bolus once  lactated ringers. 1000 milliLiter(s) (125 mL/Hr) IV Continuous <Continuous>  NIFEdipine XL 60 milliGRAM(s) Oral every 24 hours  oxytocin Infusion 42 milliUNIT(s)/Min (42 mL/Hr) IV Continuous <Continuous>    MEDICATIONS  (PRN):  diphenhydrAMINE 25 milliGRAM(s) Oral every 6 hours PRN Pruritus  lanolin Ointment 1 Application(s) Topical every 6 hours PRN Sore Nipples  magnesium hydroxide Suspension 30 milliLiter(s) Oral two times a day PRN Constipation  oxyCODONE    IR 5 milliGRAM(s) Oral every 3 hours PRN Moderate to Severe Pain (4-10)  oxyCODONE    IR 5 milliGRAM(s) Oral once PRN Moderate to Severe Pain (4-10)  simethicone 80 milliGRAM(s) Chew every 4 hours PRN Gas

## 2024-10-18 NOTE — CONSULT NOTE ADULT - ASSESSMENT
# Pre-eclampsia   BP range 138//78. Current regimen Nifedipine 60 mg 24h, Labetalol 300mg Q8H  - Labetalol 300mg Q8H  - Nifedipine 60mg Q24H.      # Pre-eclampsia without severe features  BP range 138//78. Current regimen Nifedipine 60 mg 24h, Labetalol 300mg Q8H  - Labetalol 300mg Q8H  - Nifedipine 60mg Q24H.   -ctm for thrombocytopenia, elevated Cr, and changes in vision or headaches.  -Give additional nifedipine if needed. Instructions as follows: give another 30 of nifedipine if BP with SBP >140 and increased to 90 Nifedipine. If perisistently elevated despite increase in dose, give another 30 and increase to 120 TOTAL bid (60 mg twice a day).    Please follow up with Dr. Dhaliwal outpatient for continued monitoring.     Please reach out with further questions. Cardiology will continue to follow.     All notes are preliminary before attending attestations.      # Pre-eclampsia without severe features  BP range 138//78. Current regimen Nifedipine 60 mg 24h, Labetalol 300mg Q8H  - Labetalol 300mg Q8H  - Nifedipine 60mg Q24H. Give additional 30g nifedipine tonight, and start 90mg daily tomorrow.   - If perisistently elevated despite increase in dose, give another 30 and increase to 120 TOTAL (60 mg twice a day).  - Check TTE tomorrow  - EKG    Please follow up with Dr. Dhaliwal outpatient for continued monitoring.     Please reach out with further questions. Cardiology will continue to follow.     All notes are preliminary before attending attestations.      35 yo female with no significant PMH who was admitted for induction of labor and is now s/p  10/13. Cardiology was consulted for pre-eclampsia without severe features.     # Pre-eclampsia without severe features  BP range 138//78. Current regimen Nifedipine 60 mg 24h, Labetalol 300mg Q8H  - SBP goal <140  - Continue Labetalol 300mg Q8H. Can up-titrate to 400mg TID as needed. (max dose 2400mg in 24 hr)  - Currently Nifedipine 60mg Q24H. Give additional 30g nifedipine tonight, to start 90mg nifedipine daily tomorrow.   - If persistently elevated despite increase in dose, give another 30 and increase to 120 TOTAL (60 mg twice a day).  - Check TTE tomorrow  - EKG    Please arrange follow up with Dr. Millicent Dhaliwal outpatient for continued monitoring.     Please reach out with further questions. Cardiology will continue to follow.      33 yo female with no significant PMH who was admitted for induction of labor and is now s/p  10/13. Cardiology was consulted for pre-eclampsia without severe features.     # Pre-eclampsia without severe features  BP range 138//78. Current regimen Nifedipine 60 mg 24h, Labetalol 300mg Q8H  - SBP goal <140  - Continue Labetalol 300mg Q8H. Can up-titrate to 400mg TID as needed. (max dose 2400mg in 24 hr)  - Currently Nifedipine 60mg Q24H. Give additional 30g nifedipine tonight, to start 90mg nifedipine daily tomorrow.   - Check TTE tomorrow  - Check EKG    Please arrange follow up with Dr. Millicent Dhaliwal outpatient for continued monitoring.     Please reach out with further questions. Cardiology will continue to follow.

## 2024-10-19 ENCOUNTER — RESULT REVIEW (OUTPATIENT)
Age: 34
End: 2024-10-19

## 2024-10-19 LAB
ALBUMIN SERPL ELPH-MCNC: 3.3 G/DL — SIGNIFICANT CHANGE UP (ref 3.3–5)
ALP SERPL-CCNC: 188 U/L — HIGH (ref 40–120)
ALT FLD-CCNC: 73 U/L — HIGH (ref 10–45)
ANION GAP SERPL CALC-SCNC: 9 MMOL/L — SIGNIFICANT CHANGE UP (ref 5–17)
AST SERPL-CCNC: 40 U/L — SIGNIFICANT CHANGE UP (ref 10–40)
BASOPHILS # BLD AUTO: 0.03 K/UL — SIGNIFICANT CHANGE UP (ref 0–0.2)
BASOPHILS NFR BLD AUTO: 0.3 % — SIGNIFICANT CHANGE UP (ref 0–2)
BILIRUB SERPL-MCNC: 0.3 MG/DL — SIGNIFICANT CHANGE UP (ref 0.2–1.2)
BUN SERPL-MCNC: 7 MG/DL — SIGNIFICANT CHANGE UP (ref 7–23)
CALCIUM SERPL-MCNC: 7.6 MG/DL — LOW (ref 8.4–10.5)
CHLORIDE SERPL-SCNC: 103 MMOL/L — SIGNIFICANT CHANGE UP (ref 96–108)
CO2 SERPL-SCNC: 23 MMOL/L — SIGNIFICANT CHANGE UP (ref 22–31)
CREAT SERPL-MCNC: 0.69 MG/DL — SIGNIFICANT CHANGE UP (ref 0.5–1.3)
EGFR: 115 ML/MIN/1.73M2 — SIGNIFICANT CHANGE UP
EOSINOPHIL # BLD AUTO: 0.15 K/UL — SIGNIFICANT CHANGE UP (ref 0–0.5)
EOSINOPHIL NFR BLD AUTO: 1.4 % — SIGNIFICANT CHANGE UP (ref 0–6)
GLUCOSE SERPL-MCNC: 111 MG/DL — HIGH (ref 70–99)
HCT VFR BLD CALC: 26.6 % — LOW (ref 34.5–45)
HGB BLD-MCNC: 8.9 G/DL — LOW (ref 11.5–15.5)
IMM GRANULOCYTES NFR BLD AUTO: 2.7 % — HIGH (ref 0–0.9)
LYMPHOCYTES # BLD AUTO: 1.72 K/UL — SIGNIFICANT CHANGE UP (ref 1–3.3)
LYMPHOCYTES # BLD AUTO: 15.8 % — SIGNIFICANT CHANGE UP (ref 13–44)
MAGNESIUM SERPL-MCNC: 4.6 MG/DL — HIGH (ref 1.6–2.6)
MAGNESIUM SERPL-MCNC: 5.5 MG/DL — HIGH (ref 1.6–2.6)
MAGNESIUM SERPL-MCNC: 5.8 MG/DL — HIGH (ref 1.6–2.6)
MCHC RBC-ENTMCNC: 31.6 PG — SIGNIFICANT CHANGE UP (ref 27–34)
MCHC RBC-ENTMCNC: 33.5 GM/DL — SIGNIFICANT CHANGE UP (ref 32–36)
MCV RBC AUTO: 94.3 FL — SIGNIFICANT CHANGE UP (ref 80–100)
MONOCYTES # BLD AUTO: 0.53 K/UL — SIGNIFICANT CHANGE UP (ref 0–0.9)
MONOCYTES NFR BLD AUTO: 4.9 % — SIGNIFICANT CHANGE UP (ref 2–14)
NEUTROPHILS # BLD AUTO: 8.15 K/UL — HIGH (ref 1.8–7.4)
NEUTROPHILS NFR BLD AUTO: 74.9 % — SIGNIFICANT CHANGE UP (ref 43–77)
NRBC # BLD: 0 /100 WBCS — SIGNIFICANT CHANGE UP (ref 0–0)
PLATELET # BLD AUTO: 309 K/UL — SIGNIFICANT CHANGE UP (ref 150–400)
POTASSIUM SERPL-MCNC: 3.8 MMOL/L — SIGNIFICANT CHANGE UP (ref 3.5–5.3)
POTASSIUM SERPL-SCNC: 3.8 MMOL/L — SIGNIFICANT CHANGE UP (ref 3.5–5.3)
PROT SERPL-MCNC: 6.6 G/DL — SIGNIFICANT CHANGE UP (ref 6–8.3)
RBC # BLD: 2.82 M/UL — LOW (ref 3.8–5.2)
RBC # FLD: 12.7 % — SIGNIFICANT CHANGE UP (ref 10.3–14.5)
SODIUM SERPL-SCNC: 135 MMOL/L — SIGNIFICANT CHANGE UP (ref 135–145)
WBC # BLD: 10.87 K/UL — HIGH (ref 3.8–10.5)
WBC # FLD AUTO: 10.87 K/UL — HIGH (ref 3.8–10.5)

## 2024-10-19 PROCEDURE — 99232 SBSQ HOSP IP/OBS MODERATE 35: CPT

## 2024-10-19 PROCEDURE — 93308 TTE F-UP OR LMTD: CPT | Mod: 26

## 2024-10-19 RX ADMIN — Medication 300 MILLIGRAM(S): at 14:40

## 2024-10-19 RX ADMIN — Medication 300 MILLIGRAM(S): at 22:01

## 2024-10-19 RX ADMIN — Medication 75 MILLILITER(S): at 10:40

## 2024-10-19 RX ADMIN — Medication 975 MILLIGRAM(S): at 03:27

## 2024-10-19 RX ADMIN — Medication 40 MILLIGRAM(S): at 23:48

## 2024-10-19 RX ADMIN — Medication 30 MILLIGRAM(S): at 22:01

## 2024-10-19 RX ADMIN — Medication 975 MILLIGRAM(S): at 20:20

## 2024-10-19 RX ADMIN — Medication 600 MILLIGRAM(S): at 06:08

## 2024-10-19 RX ADMIN — Medication 975 MILLIGRAM(S): at 09:28

## 2024-10-19 RX ADMIN — Medication 300 MILLIGRAM(S): at 06:08

## 2024-10-19 RX ADMIN — Medication 60 MILLIGRAM(S): at 09:55

## 2024-10-19 RX ADMIN — Medication 50 GM/HR: at 17:14

## 2024-10-19 RX ADMIN — Medication 600 MILLIGRAM(S): at 11:26

## 2024-10-19 RX ADMIN — Medication 975 MILLIGRAM(S): at 14:40

## 2024-10-19 RX ADMIN — Medication 40 MILLIGRAM(S): at 11:26

## 2024-10-19 RX ADMIN — Medication 325 MILLIGRAM(S): at 06:08

## 2024-10-19 RX ADMIN — Medication 600 MILLIGRAM(S): at 12:26

## 2024-10-19 RX ADMIN — Medication 975 MILLIGRAM(S): at 10:28

## 2024-10-19 RX ADMIN — Medication 600 MILLIGRAM(S): at 17:53

## 2024-10-19 RX ADMIN — Medication 600 MILLIGRAM(S): at 23:48

## 2024-10-19 RX ADMIN — Medication 975 MILLIGRAM(S): at 15:40

## 2024-10-19 NOTE — CHART NOTE - NSCHARTNOTEFT_GEN_A_CORE
33yo  s/p primary C/S at 37w1d following arrest of descent after IOL for PEC w/o SF now with persistent mild range BPs this morning on POD2, -152/87-93.  Patient was dx with HTN around 35wks, does report hx of "white coat HTN" but denies overt HTN diagnosis outside of pregnancy.  Currently, she is asymptomatic and denies headache, blurry vision, scotoma, LUQ/epigastric pain, N/V, severe edema.  Passing gas, pain is well controlled, and meeting other postoperative milestones.    Vital Signs Last 24 Hrs  T(C): 36.7 (16 Oct 2024 09:31), Max: 37.2 (15 Oct 2024 14:04)  T(F): 98 (16 Oct 2024 09:31), Max: 98.9 (15 Oct 2024 14:04)  HR: 93 (16 Oct 2024 09:31) (85 - 103)  BP: 152/93 (16 Oct 2024 09:31) (126/83 - 152/93)  RR: 17 (16 Oct 2024 09:31) (17 - 18)  SpO2: 98% (16 Oct 2024 09:31) (96% - 98%)    Gen: patient comfortable sitting in bed in NAD  Abd: soft, no LUQ/epigastric tenderness, incision C/D/I  Edema: 1+ LE edema b/l    A/P: 33yo  s/p primary C/S at 37w1d c/b PEC w/o SF now with persistent mild range BPs on POD#2  - Plan to start Labetalol 200mg BID  - Patient will alert staff of PEC sxs  - No labs at this time  - No discharge today  - Patient aware, questions asked and answered    Discussed with Dr. Younger.
At bedside assessed for increasing BP in the mild ranges 150s/80-90s.  Pt denies HA, vision changes, SOB, CP, RUQ/epigastric pain.  Feels well, no N/V, tolerating diet.    Exam  Gen: NAD, resting well in bed  Resp: CTABL, on RA   CV: S1 S2 NRR   Abd: no RUQ/epigastric pain on palpation   Ext: 1+ LE edema, 1+ brachioradialis reflexes b/l    No toxic s/sx PEC at this time  stat repeat full labs   increasing nifedipine to 60AM/30PM per cardiology, c/w labetalol 300 TID    d/w Micky PGY3. Dr. Younger attending aware.
Pt is a 34y yo s/p pCS at 37w, c/b PEC w/ SF, seen for a magnesium check. She denies HA, vision changes, dizziness, SOB, n/v, RUQ/epigastric pain.     Physical Exam:  T(C): 36.7 (10-19-24 @ 02:00), Max: 37 (10-18-24 @ 06:00)  HR: 73 (10-19-24 @ 02:00) (73 - 90)  BP: 129/82 (10-19-24 @ 02:00) (129/82 - 153/78)  RR: 18 (10-19-24 @ 02:00) (16 - 18)  SpO2: 96% (10-19-24 @ 02:00) (96% - 99%)    10-18-24 @ 07:01  -  10-19-24 @ 03:31  --------------------------------------------------------  IN: 625 mL / OUT: 1800 mL / NET: -1175 mL      General: NAD, AAOx3  Pulm: no increased WOB, lungs clear to auscultation bilaterally  Abd: no tenderness in RUQ/epigastric areas  Extremities: warm/dry/intact skin, biceps reflexes 1+ bilaterally, no edema    A&P:   Pt is a 34y yo s/p PECwSF, c/b PEC w/ SF, seen for a magnesium check.       1. Preeclampsia: Continue IV Magnesium @2G/hr for 24 hrs post delivery until 2130  Denying toxic symptoms currently.   Antihypertensives: labetalol 300 TID, nifedipine 30/60   Continue to monitor blood pressures.   Next Magnesium check @ 0930  Follow up Mg level.     2. GI: regular diet    3. : strict Os,
Pt seen for a clinical magnesium check. She denies HA, vision changes, dizziness, SOB, n/v, RUQ/epigastric pain.     Physical Exam:  T(C): 36.6 (10-19-24 @ 08:00), Max: 36.8 (10-18-24 @ 18:20)  HR: 73 (10-19-24 @ 08:00) (70 - 90)  BP: 123/77 (10-19-24 @ 08:00) (123/77 - 153/78)  RR: 16 (10-19-24 @ 08:00) (16 - 18)  SpO2: 97% (10-19-24 @ 08:00) (96% - 99%)    10-18-24 @ 07:01  -  10-19-24 @ 07:00  --------------------------------------------------------  IN: 625 mL / OUT: 2700 mL / NET: -2075 mL    10-19-24 @ 07:01  -  10-19-24 @ 09:21  --------------------------------------------------------  IN: 0 mL / OUT: 950 mL / NET: -950 mL      General: NAD, AAOx3  Pulm: no increased WOB, lungs clear to auscultation bilaterally  Abd: no tenderness in RUQ/epigastric areas  Extremities: warm/dry/intact skin, patellar reflexes 2+ bilaterally, no edema    A&P:   Pt is a 34y yo s/p PECwSF, c/b PEC w/ SF, seen for a magnesium check.   1. Preeclampsia: Continue IV Magnesium @2G/hr for 24 hrs post delivery until 2130  Denying toxic symptoms currently.   Antihypertensives: labetalol 300 TID, nifedipine 30/60   Continue to monitor blood pressures.   Next Magnesium check @ 1530  Last Mg level 4.6  Follow up Mg level.   2. GI: regular diet  3. : strict Os
note delayed due to clinical care  patient /94  dw Dr. Younger, increase labetalol to 200 TID   Continue to monitor, VSQ4
Pt seen for a magnesium check. Endorses mild dizziness while walking. She denies HA, vision changes, SOB, n/v, RUQ/epigastric pain.     Physical Exam:  T(C): 36.8 (10-19-24 @ 14:00), Max: 36.8 (10-18-24 @ 18:20)  HR: 79 (10-19-24 @ 14:00) (70 - 89)  BP: 129/77 (10-19-24 @ 14:00) (122/76 - 151/86)  RR: 18 (10-19-24 @ 14:00) (16 - 18)  SpO2: 98% (10-19-24 @ 14:00) (96% - 99%)    10-18-24 @ 07:01  -  10-19-24 @ 07:00  --------------------------------------------------------  IN: 625 mL / OUT: 2700 mL / NET: -2075 mL    10-19-24 @ 07:01  -  10-19-24 @ 15:15  --------------------------------------------------------  IN: 0 mL / OUT: 2350 mL / NET: -2350 mL      General: NAD, AAOx3  Pulm: no increased WOB, lungs clear to auscultation bilaterally  Abd: no tenderness in RUQ/epigastric areas  Extremities: warm/dry/intact skin, patellar reflexes 2+ bilaterally, no edema       Pt is a 34y yo s/p PECwSF, c/b PEC w/ SF, seen for a magnesium check.   1. Preeclampsia: Continue IV Magnesium @2G/hr for 24 hrs post delivery until 2130  Denying toxic symptoms currently.   Antihypertensives: labetalol 300 TID, nifedipine 30/60   Continue to monitor blood pressures.   Last Mg level 5.5  Follow up Mg level.   2. GI: regular diet  3. : strict Os.
repeat labwork shows transaminitis of 62/96  Patient rules in for PECwSF at this time   Saw pt at bedside regarding new diagnosis and treatment with IV magnesium x 24 hours. Discussed risks of preeclampsia with severe features and progression to eclampsia.   Patient endorses understanding, is agreeable to IV Mg at this time.    Pt to be transferred to high risk for IV Mg 2g/h x24 hours.  C/w BP regimen nifedipine 60/30 and labetalol 300 TID

## 2024-10-19 NOTE — PROGRESS NOTE ADULT - ATTENDING COMMENTS
35 yo woman with no significant medical history  now s/p  has pre-eclampsia during pregnancy   Post-delivery she remains on Magnesium and is on Labetalol 300 mg tid and Nifedipine 60 mg daily   BP is well controlled on current regimen   Normal labs - mother and baby doing well currently   Mild LE edema, improving with spontaneous diuresis     - Continue Nifedipine 60 mg daily - re asses after Mag is stopped   - Continue Labetalol 300 mg tid   - TTE today   - F/u with cardiology as outpatient     Jong Oliver MD

## 2024-10-20 VITALS
RESPIRATION RATE: 18 BRPM | HEART RATE: 82 BPM | SYSTOLIC BLOOD PRESSURE: 135 MMHG | DIASTOLIC BLOOD PRESSURE: 84 MMHG | OXYGEN SATURATION: 98 % | TEMPERATURE: 99 F

## 2024-10-20 PROCEDURE — 85384 FIBRINOGEN ACTIVITY: CPT

## 2024-10-20 PROCEDURE — 83615 LACTATE (LD) (LDH) ENZYME: CPT

## 2024-10-20 PROCEDURE — C1889: CPT

## 2024-10-20 PROCEDURE — 36415 COLL VENOUS BLD VENIPUNCTURE: CPT

## 2024-10-20 PROCEDURE — 84156 ASSAY OF PROTEIN URINE: CPT

## 2024-10-20 PROCEDURE — 86850 RBC ANTIBODY SCREEN: CPT

## 2024-10-20 PROCEDURE — 80053 COMPREHEN METABOLIC PANEL: CPT

## 2024-10-20 PROCEDURE — 93306 TTE W/DOPPLER COMPLETE: CPT

## 2024-10-20 PROCEDURE — 59050 FETAL MONITOR W/REPORT: CPT

## 2024-10-20 PROCEDURE — 86900 BLOOD TYPING SEROLOGIC ABO: CPT

## 2024-10-20 PROCEDURE — 86780 TREPONEMA PALLIDUM: CPT

## 2024-10-20 PROCEDURE — 86901 BLOOD TYPING SEROLOGIC RH(D): CPT

## 2024-10-20 PROCEDURE — 83735 ASSAY OF MAGNESIUM: CPT

## 2024-10-20 PROCEDURE — 82570 ASSAY OF URINE CREATININE: CPT

## 2024-10-20 PROCEDURE — 85025 COMPLETE CBC W/AUTO DIFF WBC: CPT

## 2024-10-20 PROCEDURE — 84550 ASSAY OF BLOOD/URIC ACID: CPT

## 2024-10-20 RX ORDER — NIFEDIPINE 90 MG
1 TABLET, EXTENDED RELEASE 24 HR ORAL
Qty: 30 | Refills: 1
Start: 2024-10-20

## 2024-10-20 RX ORDER — LABETALOL HCL 200 MG
1 TABLET ORAL
Qty: 1 | Refills: 0
Start: 2024-10-20

## 2024-10-20 RX ORDER — NIFEDIPINE 90 MG
1 TABLET, EXTENDED RELEASE 24 HR ORAL
Qty: 0 | Refills: 0 | DISCHARGE
Start: 2024-10-20

## 2024-10-20 RX ADMIN — Medication 975 MILLIGRAM(S): at 09:20

## 2024-10-20 RX ADMIN — Medication 325 MILLIGRAM(S): at 05:58

## 2024-10-20 RX ADMIN — Medication 300 MILLIGRAM(S): at 14:06

## 2024-10-20 RX ADMIN — Medication 60 MILLIGRAM(S): at 10:14

## 2024-10-20 RX ADMIN — Medication 975 MILLIGRAM(S): at 01:56

## 2024-10-20 RX ADMIN — Medication 975 MILLIGRAM(S): at 15:01

## 2024-10-20 RX ADMIN — Medication 600 MILLIGRAM(S): at 12:12

## 2024-10-20 RX ADMIN — Medication 40 MILLIGRAM(S): at 12:12

## 2024-10-20 RX ADMIN — Medication 600 MILLIGRAM(S): at 05:58

## 2024-10-20 RX ADMIN — Medication 300 MILLIGRAM(S): at 05:58

## 2024-10-20 NOTE — PROGRESS NOTE ADULT - SUBJECTIVE AND OBJECTIVE BOX
Cardiology Consult    Subjective:    Interval History:  -KEITHEON   Pt seen and examined at bedside, NAD, denies chest pain/discomfort/pressure. ROS unremarkable   -BP managed with nifedipine + labetalol with SBPs in 120-130s over the last 12 hours    Telemetry: NSR    Review of Systems:  See above HPI for Review of Systems    OBJECTIVE  T(C): 36.7 (10-19-24 @ 10:00), Max: 36.8 (10-18-24 @ 18:20)  HR: 79 (10-19-24 @ 12:00) (70 - 90)  BP: 122/76 (10-19-24 @ 12:00) (122/76 - 153/78)  RR: 18 (10-19-24 @ 12:00) (16 - 18)  SpO2: 99% (10-19-24 @ 12:00) (96% - 99%)    10-18-24 @ 07:01  -  10-19-24 @ 07:00  --------------------------------------------------------  IN: 625 mL / OUT: 2700 mL / NET: -2075 mL    10-19-24 @ 07:01  -  10-19-24 @ 13:08  --------------------------------------------------------  IN: 0 mL / OUT: 2350 mL / NET: -2350 mL        PHYSICAL EXAM:    Constitutional: resting comfortably in bed; NAD  HEENT: NC/AT, PERRL, EOMI, anicteric sclera, no nasal discharge; uvula midline, no oropharyngeal erythema or exudates; MMM  Neck: supple; no thyromegaly, no JVD  Respiratory: CTA B/L; no W/R/R, no retractions  Cardiac: +S1/S2; RRR; no M/R/G; PMI non-displaced  Gastrointestinal: soft, NT/ND; no rebound or guarding; +BSx4  Extremities: WWP, no clubbing or cyanosis; 1+ pitting edema bilaterally  Musculoskeletal: NROM x4; no joint swelling, tenderness or erythema  Vascular: 2+ radial, DP/PT pulses B/L  Dermatologic: skin warm, dry and intact; no rashes, wounds, or scars  Lymphatic: no submandibular or cervical LAD  Neurologic: AAOx3; CNII-XII grossly intact; no focal deficits    LABS:                        8.9    10.87 )-----------( 309      ( 19 Oct 2024 09:13 )             26.6     10-19    135  |  103  |  7   ----------------------------<  111[H]  3.8   |  23  |  0.69    Ca    7.6[L]      19 Oct 2024 09:13  Mg     5.5     10-19    TPro  6.6  /  Alb  3.3  /  TBili  0.3  /  DBili  x   /  AST  40  /  ALT  73[H]  /  AlkPhos  188[H]  10-19      Urinalysis Basic - ( 19 Oct 2024 09:13 )    Color: x / Appearance: x / SG: x / pH: x  Gluc: 111 mg/dL / Ketone: x  / Bili: x / Urobili: x   Blood: x / Protein: x / Nitrite: x   Leuk Esterase: x / RBC: x / WBC x   Sq Epi: x / Non Sq Epi: x / Bacteria: x        >>> <<<  RADIOLOGY & ADDITIONAL TESTS:  Reviewed .    MEDICATIONS  (STANDING):  acetaminophen     Tablet .. 975 milliGRAM(s) Oral <User Schedule>  acetaminophen   IVPB .. 1000 milliGRAM(s) IV Intermittent once  diphtheria/tetanus/pertussis (acellular) Vaccine (Adacel) 0.5 milliLiter(s) IntraMuscular once  enoxaparin Injectable 40 milliGRAM(s) SubCutaneous every 12 hours  ferrous    sulfate 325 milliGRAM(s) Oral every 24 hours  ibuprofen  Tablet. 600 milliGRAM(s) Oral every 6 hours  labetalol 300 milliGRAM(s) Oral every 8 hours  lactated ringers. 1000 milliLiter(s) (75 mL/Hr) IV Continuous <Continuous>  magnesium sulfate Infusion 2 Gm/Hr (50 mL/Hr) IV Continuous <Continuous>  NIFEdipine XL 60 milliGRAM(s) Oral every 24 hours  NIFEdipine XL 30 milliGRAM(s) Oral every 24 hours  oxytocin Infusion 42 milliUNIT(s)/Min (42 mL/Hr) IV Continuous <Continuous>    MEDICATIONS  (PRN):  diphenhydrAMINE 25 milliGRAM(s) Oral every 6 hours PRN Pruritus  lanolin Ointment 1 Application(s) Topical every 6 hours PRN Sore Nipples  magnesium hydroxide Suspension 30 milliLiter(s) Oral two times a day PRN Constipation  oxyCODONE    IR 5 milliGRAM(s) Oral once PRN Moderate to Severe Pain (4-10)  oxyCODONE    IR 5 milliGRAM(s) Oral every 3 hours PRN Moderate to Severe Pain (4-10)  simethicone 80 milliGRAM(s) Chew every 4 hours PRN Gas    
Patient evaluated at bedside this morning, resting comfortable in bed, no acute events overnight. She reports pain is well-controlled.  She denies headache, dizziness, changes in vision, chest pain, palpitations, shortness of breath, RUQ pain, nausea, vomiting, fever, chills, heavy vaginal bleeding.  She has been ambulating without assistance, voiding spontaneously, tolerating food.      Physical Exam:  T(C): 36.4 (10-18-24 @ 02:00), Max: 36.8 (10-17-24 @ 21:54)  HR: 90 (10-18-24 @ 02:00) (90 - 93)  BP: 133/86 (10-18-24 @ 02:00) (133/86 - 147/84)  RR: 18 (10-18-24 @ 02:00) (17 - 18)  SpO2: 96% (10-18-24 @ 02:00) (96% - 100%)    Gen: no apparent distress  Pulm: no increased work of breathing  Abd: soft, nontender, nondistended, no rebound or guarding, uterus firm  Extremities: trace pedal edema bilaterally, no calf tenderness bilaterally            acetaminophen     Tablet .. 975 milliGRAM(s) Oral <User Schedule>  acetaminophen   IVPB .. 1000 milliGRAM(s) IV Intermittent once  diphenhydrAMINE 25 milliGRAM(s) Oral every 6 hours PRN  diphtheria/tetanus/pertussis (acellular) Vaccine (Adacel) 0.5 milliLiter(s) IntraMuscular once  enoxaparin Injectable 40 milliGRAM(s) SubCutaneous every 12 hours  ferrous    sulfate 325 milliGRAM(s) Oral every 24 hours  ibuprofen  Tablet. 600 milliGRAM(s) Oral every 6 hours  labetalol 300 milliGRAM(s) Oral every 8 hours  lactated ringers Bolus 500 milliLiter(s) IV Bolus once  lactated ringers. 1000 milliLiter(s) IV Continuous <Continuous>  lanolin Ointment 1 Application(s) Topical every 6 hours PRN  magnesium hydroxide Suspension 30 milliLiter(s) Oral two times a day PRN  NIFEdipine XL 30 milliGRAM(s) Oral every 24 hours  oxyCODONE    IR 5 milliGRAM(s) Oral every 3 hours PRN  oxyCODONE    IR 5 milliGRAM(s) Oral once PRN  oxytocin Infusion 42 milliUNIT(s)/Min IV Continuous <Continuous>  simethicone 80 milliGRAM(s) Chew every 4 hours PRN  
Patient evaluated at bedside this morning, resting comfortable in bed.  Overnight, patient continued having uptrending BPs, so repeat labs were drawn and noted transaminitis (AST/ALT 62/96). She was started on IV magnesium for preeclampsia with severe features.  Overall, she reports pain is well-controlled.  She denies headache, dizziness, changes in vision, chest pain, palpitations, shortness of breath, RUQ pain, nausea, vomiting, fever, chills, heavy vaginal bleeding.  She has been ambulating without assistance (prior to IV Mg), voiding spontaneously, tolerating food, passing flatus and BM      Physical Exam:  T(C): 36.5 (10-19-24 @ 03:30), Max: 36.8 (10-18-24 @ 18:20)  HR: 70 (10-19-24 @ 03:30) (70 - 89)  BP: 136/87 (10-19-24 @ 03:30) (129/82 - 151/86)  RR: 16 (10-19-24 @ 03:30) (16 - 18)  SpO2: 99% (10-19-24 @ 03:30) (96% - 99%)    Gen: no apparent distress  Pulm: no increased work of breathing; clear to auscultation bilaterally  Abd: soft, nontender, nondistended, no rebound or guarding, uterus firm; incision clean dry intact  Extremities: trace pedal edema bilaterally, no calf tenderness bilaterally; patellar reflexes 2+ and symmetric bilaterally                          9.1    12.78 )-----------( 312      ( 18 Oct 2024 19:24 )             27.6     10-18    136  |  105  |  7   ----------------------------<  104[H]  4.4   |  23  |  0.57    Ca    9.2      18 Oct 2024 19:24  Mg     4.6     10-19    TPro  7.2  /  Alb  3.7  /  TBili  0.2  /  DBili  x   /  AST  62[H]  /  ALT  96[H]  /  AlkPhos  202[H]  10-18    acetaminophen     Tablet .. 975 milliGRAM(s) Oral <User Schedule>  acetaminophen   IVPB .. 1000 milliGRAM(s) IV Intermittent once  diphenhydrAMINE 25 milliGRAM(s) Oral every 6 hours PRN  diphtheria/tetanus/pertussis (acellular) Vaccine (Adacel) 0.5 milliLiter(s) IntraMuscular once  enoxaparin Injectable 40 milliGRAM(s) SubCutaneous every 12 hours  ferrous    sulfate 325 milliGRAM(s) Oral every 24 hours  ibuprofen  Tablet. 600 milliGRAM(s) Oral every 6 hours  labetalol 300 milliGRAM(s) Oral every 8 hours  lactated ringers. 1000 milliLiter(s) IV Continuous <Continuous>  lanolin Ointment 1 Application(s) Topical every 6 hours PRN  magnesium hydroxide Suspension 30 milliLiter(s) Oral two times a day PRN  magnesium sulfate Infusion 2 Gm/Hr IV Continuous <Continuous>  NIFEdipine XL 60 milliGRAM(s) Oral every 24 hours  NIFEdipine XL 30 milliGRAM(s) Oral every 24 hours  oxyCODONE    IR 5 milliGRAM(s) Oral every 3 hours PRN  oxyCODONE    IR 5 milliGRAM(s) Oral once PRN  oxytocin Infusion 42 milliUNIT(s)/Min IV Continuous <Continuous>  simethicone 80 milliGRAM(s) Chew every 4 hours PRN  
34y  POD1 s/p pCS seen at bedside for urine output evaluation. Patient resting comfortably in bed with no acute events overnight. PA called for evaluation of 70cc of concentrated urine output over the past 3 hours. Nurse reports that no I&Os were charted prior to change of shift so baseline UOP is unknown at this time. Patient denies headache, dizziness, or lightheadedness. Patient is overall feeling well. She denies any chest pain, palpitations, or shortness of breath. She has not tried ambulating since the procedure, gonzalez remains in place.     Physical Exam:  Vital Signs Last 24 Hrs  T(C): 36.7 (14 Oct 2024 09:34), Max: 38.2 (13 Oct 2024 23:15)  T(F): 98 (14 Oct 2024 09:34), Max: 100.8 (13 Oct 2024 23:15)  HR: 81 (14 Oct 2024 09:34) (81 - 110)  BP: 136/81 (14 Oct 2024 09:34) (125/67 - 149/81)  BP(mean): 105 (14 Oct 2024 01:20) (105 - 105)  RR: 18 (14 Oct 2024 09:34) (16 - 19)  SpO2: 97% (14 Oct 2024 09:34) (95% - 100%)  UOP: 70cc of dark yellow concentrated urine over the past 3 hours    Parameters below as of 14 Oct 2024 09:34  Patient On (Oxygen Delivery Method): room air    GA: comfortable in NAD  Lungs: Lungs clear to auscultation No rales, rhonchi, or wheezes. Respiratory rate normal   Abd: soft, nontender, nondistended, no rebound or guarding. No ascites  : gonzalez in situ, lochia WNL  Extremities: no swelling or calf tenderness, SCDs in place                            9.2    24.36 )-----------( 193      ( 14 Oct 2024 05:30 )             27.2     10-12    134[L]  |  102  |  8   ----------------------------<  92  3.8   |  20[L]  |  0.59    Ca    9.5      12 Oct 2024 22:53    TPro  7.1  /  Alb  3.6  /  TBili  0.3  /  DBili  x   /  AST  16  /  ALT  11  /  AlkPhos  338[H]  10-12      acetaminophen     Tablet .. 975 milliGRAM(s) Oral <User Schedule>  acetaminophen   IVPB .. 1000 milliGRAM(s) IV Intermittent once  diphenhydrAMINE 25 milliGRAM(s) Oral every 6 hours PRN  diphtheria/tetanus/pertussis (acellular) Vaccine (Adacel) 0.5 milliLiter(s) IntraMuscular once  enoxaparin Injectable 40 milliGRAM(s) SubCutaneous every 12 hours  ferrous    sulfate 325 milliGRAM(s) Oral every 24 hours  ibuprofen  Tablet. 600 milliGRAM(s) Oral every 6 hours  ketorolac   Injectable 30 milliGRAM(s) IV Push every 6 hours  lactated ringers Bolus 500 milliLiter(s) IV Bolus once  lactated ringers. 1000 milliLiter(s) IV Continuous <Continuous>  lanolin Ointment 1 Application(s) Topical every 6 hours PRN  magnesium hydroxide Suspension 30 milliLiter(s) Oral two times a day PRN  oxyCODONE    IR 5 milliGRAM(s) Oral once PRN  oxyCODONE    IR 5 milliGRAM(s) Oral every 3 hours PRN  oxytocin Infusion 42 milliUNIT(s)/Min IV Continuous <Continuous>  simethicone 80 milliGRAM(s) Chew every 4 hours PRN  
Patient evaluated at bedside this morning, resting comfortable in bed, no acute events overnight. She reports pain is well-controlled.  She denies headache, dizziness, changes in vision, chest pain, palpitations, shortness of breath, RUQ pain, nausea, vomiting, fever, chills, heavy vaginal bleeding.  She has been ambulating without assistance, voiding spontaneously, tolerating food.      Physical Exam:  T(C): 36.9 (10-16-24 @ 02:00), Max: 37.1 (10-15-24 @ 21:12)  HR: 85 (10-16-24 @ 02:00) (85 - 97)  BP: 134/90 (10-16-24 @ 02:00) (134/90 - 136/84)  RR: 18 (10-16-24 @ 02:00) (18 - 18)  SpO2: 97% (10-16-24 @ 02:00) (97% - 97%)    Gen: no apparent distress  Pulm: no increased work of breathing  Abd: soft, nontender, nondistended, no rebound or guarding, uterus firm; incision clean dry intact  Extremities: trace pedal edema bilaterally, no calf tenderness bilaterally                          9.2    24.36 )-----------( 193      ( 14 Oct 2024 05:30 )             27.2           acetaminophen     Tablet .. 975 milliGRAM(s) Oral <User Schedule>  acetaminophen   IVPB .. 1000 milliGRAM(s) IV Intermittent once  diphenhydrAMINE 25 milliGRAM(s) Oral every 6 hours PRN  diphtheria/tetanus/pertussis (acellular) Vaccine (Adacel) 0.5 milliLiter(s) IntraMuscular once  enoxaparin Injectable 40 milliGRAM(s) SubCutaneous every 12 hours  ferrous    sulfate 325 milliGRAM(s) Oral every 24 hours  ibuprofen  Tablet. 600 milliGRAM(s) Oral every 6 hours  lactated ringers Bolus 500 milliLiter(s) IV Bolus once  lactated ringers. 1000 milliLiter(s) IV Continuous <Continuous>  lanolin Ointment 1 Application(s) Topical every 6 hours PRN  magnesium hydroxide Suspension 30 milliLiter(s) Oral two times a day PRN  oxyCODONE    IR 5 milliGRAM(s) Oral once PRN  oxyCODONE    IR 5 milliGRAM(s) Oral every 3 hours PRN  oxytocin Infusion 42 milliUNIT(s)/Min IV Continuous <Continuous>  simethicone 80 milliGRAM(s) Chew every 4 hours PRN  
Patient evaluated at bedside this morning, resting comfortable in bed. Blood pressures were persistently mild range overnight. She reports pain is well-controlled.  She denies headache, dizziness, changes in vision, chest pain, palpitations, shortness of breath, RUQ pain, nausea, vomiting, fever, chills, heavy vaginal bleeding.  She has been ambulating without assistance, voiding spontaneously, tolerating food, passing flatus.       Physical Exam:  T(C): 36.7 (10-16-24 @ 21:49), Max: 37.3 (10-16-24 @ 18:26)  HR: 78 (10-17-24 @ 01:35) (78 - 92)  BP: 151/78 (10-17-24 @ 01:35) (139/88 - 157/94)  RR: 18 (10-17-24 @ 01:35) (18 - 18)  SpO2: 98% (10-17-24 @ 01:35) (97% - 98%)    Gen: no apparent distress  Pulm: no increased work of breathing  Abd: soft, nontender, nondistended, no rebound or guarding, uterus firm; incision clean dry intact  Extremities: trace pedal edema bilaterally, no calf tenderness bilaterally            acetaminophen     Tablet .. 975 milliGRAM(s) Oral <User Schedule>  acetaminophen   IVPB .. 1000 milliGRAM(s) IV Intermittent once  diphenhydrAMINE 25 milliGRAM(s) Oral every 6 hours PRN  diphtheria/tetanus/pertussis (acellular) Vaccine (Adacel) 0.5 milliLiter(s) IntraMuscular once  enoxaparin Injectable 40 milliGRAM(s) SubCutaneous every 12 hours  ferrous    sulfate 325 milliGRAM(s) Oral every 24 hours  ibuprofen  Tablet. 600 milliGRAM(s) Oral every 6 hours  labetalol 200 milliGRAM(s) Oral every 8 hours  lactated ringers Bolus 500 milliLiter(s) IV Bolus once  lactated ringers. 1000 milliLiter(s) IV Continuous <Continuous>  lanolin Ointment 1 Application(s) Topical every 6 hours PRN  magnesium hydroxide Suspension 30 milliLiter(s) Oral two times a day PRN  oxyCODONE    IR 5 milliGRAM(s) Oral every 3 hours PRN  oxyCODONE    IR 5 milliGRAM(s) Oral once PRN  oxytocin Infusion 42 milliUNIT(s)/Min IV Continuous <Continuous>  simethicone 80 milliGRAM(s) Chew every 4 hours PRN  
Patient evaluated at bedside this morning, resting comfortable in bed, no acute events overnight. She reports pain is well-controlled.  She denies headache, dizziness, changes in vision, chest pain, palpitations, shortness of breath, RUQ pain, nausea, vomiting, fever, chills, heavy vaginal bleeding.  She has been ambulating without assistance, voiding spontaneously, tolerating food, passing flatus.      Physical Exam:  T(C): 36.8 (10-15-24 @ 02:00), Max: 36.9 (10-14-24 @ 22:07)  HR: 82 (10-15-24 @ 02:00) (82 - 88)  BP: 108/74 (10-15-24 @ 02:00) (108/74 - 124/81)  RR: 18 (10-15-24 @ 02:00) (18 - 18)  SpO2: 95% (10-15-24 @ 02:00) (95% - 96%)    Gen: no apparent distress  Pulm: no increased work of breathing  Abd: soft, nontender, nondistended, no rebound or guarding, uterus firm; incision clean dry intact  Extremities: trace pedal edema bilaterally, no calf tenderness bilaterally                          9.2    24.36 )-----------( 193      ( 14 Oct 2024 05:30 )             27.2           acetaminophen     Tablet .. 975 milliGRAM(s) Oral <User Schedule>  acetaminophen   IVPB .. 1000 milliGRAM(s) IV Intermittent once  diphenhydrAMINE 25 milliGRAM(s) Oral every 6 hours PRN  diphtheria/tetanus/pertussis (acellular) Vaccine (Adacel) 0.5 milliLiter(s) IntraMuscular once  enoxaparin Injectable 40 milliGRAM(s) SubCutaneous every 12 hours  ferrous    sulfate 325 milliGRAM(s) Oral every 24 hours  ibuprofen  Tablet. 600 milliGRAM(s) Oral every 6 hours  lactated ringers Bolus 500 milliLiter(s) IV Bolus once  lactated ringers. 1000 milliLiter(s) IV Continuous <Continuous>  lanolin Ointment 1 Application(s) Topical every 6 hours PRN  magnesium hydroxide Suspension 30 milliLiter(s) Oral two times a day PRN  oxyCODONE    IR 5 milliGRAM(s) Oral every 3 hours PRN  oxyCODONE    IR 5 milliGRAM(s) Oral once PRN  oxytocin Infusion 42 milliUNIT(s)/Min IV Continuous <Continuous>  simethicone 80 milliGRAM(s) Chew every 4 hours PRN  
Patient evaluated at bedside this morning, resting comfortable in bed.   She reports pain is well controlled with oral pain medications.  She is feeling well and reports pain well-controlled.  She denies heavy vaginal bleeding. She denies headache, scotoma, chest pain, shortness of breath, RUQ/epigastric pain.  She has been ambulating without assistance, voiding spontaneously, passing gas, tolerating regular diet.     Physical Exam:  Vital Signs Last 24 Hrs  T(C): 36.3 (20 Oct 2024 06:08), Max: 36.8 (19 Oct 2024 14:00)  T(F): 97.4 (20 Oct 2024 06:08), Max: 98.3 (20 Oct 2024 01:56)  HR: 87 (20 Oct 2024 06:08) (73 - 89)  BP: 142/88 (20 Oct 2024 06:08) (112/70 - 142/88)  BP(mean): 106 (20 Oct 2024 06:08) (98 - 106)  RR: 18 (20 Oct 2024 06:08) (16 - 18)  SpO2: 98% (20 Oct 2024 06:08) (97% - 100%)    Parameters below as of 20 Oct 2024 06:08  Patient On (Oxygen Delivery Method): room air        GA: well-appearing, NAD  Pulm: no increased WOB  Abd: soft, nontender, no rebound or guarding, incision clean, dry and intact, uterus firm at midline,  fb below umbilicus  Extremities: no calf tenderness                             8.9    10.87 )-----------( 309      ( 19 Oct 2024 09:13 )             26.6     10-19    135  |  103  |  7   ----------------------------<  111[H]  3.8   |  23  |  0.69    Ca    7.6[L]      19 Oct 2024 09:13  Mg     5.8     10-19    TPro  6.6  /  Alb  3.3  /  TBili  0.3  /  DBili  x   /  AST  40  /  ALT  73[H]  /  AlkPhos  188[H]  10-19        
Patient evaluated at bedside this morning, resting comfortable in bed, no acute events overnight. She reports pain is well-controlled.  She denies headache, dizziness, changes in vision, chest pain, palpitations, shortness of breath, RUQ pain, nausea, vomiting, fever, chills, heavy vaginal bleeding.  She has been tolerating food.      Physical Exam:  T(C): 36.3 (10-14-24 @ 01:20), Max: 38.2 (10-13-24 @ 23:15)  HR: 89 (10-14-24 @ 01:20) (89 - 110)  BP: 139/88 (10-14-24 @ 01:20) (125/67 - 140/72)  RR: 18 (10-14-24 @ 01:20) (16 - 18)  SpO2: 96% (10-14-24 @ 01:20) (96% - 100%)    Gen: no apparent distress  Pulm: no increased work of breathing  Abd: soft, nontender, nondistended, no rebound or guarding, uterus firm; pressure dressing in place  Extremities: trace pedal edema bilaterally, no calf tenderness bilaterally                          11.4   11.23 )-----------( 230      ( 12 Oct 2024 22:53 )             33.7     10-12    134[L]  |  102  |  8   ----------------------------<  92  3.8   |  20[L]  |  0.59    Ca    9.5      12 Oct 2024 22:53    TPro  7.1  /  Alb  3.6  /  TBili  0.3  /  DBili  x   /  AST  16  /  ALT  11  /  AlkPhos  338[H]  10-12    acetaminophen     Tablet .. 975 milliGRAM(s) Oral <User Schedule>  acetaminophen   IVPB .. 1000 milliGRAM(s) IV Intermittent once  diphenhydrAMINE 25 milliGRAM(s) Oral every 6 hours PRN  diphtheria/tetanus/pertussis (acellular) Vaccine (Adacel) 0.5 milliLiter(s) IntraMuscular once  enoxaparin Injectable 40 milliGRAM(s) SubCutaneous every 12 hours  ferrous    sulfate 325 milliGRAM(s) Oral every 24 hours  ibuprofen  Tablet. 600 milliGRAM(s) Oral every 6 hours  ketorolac   Injectable 30 milliGRAM(s) IV Push every 6 hours  lactated ringers. 1000 milliLiter(s) IV Continuous <Continuous>  lanolin Ointment 1 Application(s) Topical every 6 hours PRN  magnesium hydroxide Suspension 30 milliLiter(s) Oral two times a day PRN  oxyCODONE    IR 5 milliGRAM(s) Oral once PRN  oxyCODONE    IR 5 milliGRAM(s) Oral every 3 hours PRN  oxytocin Infusion 42 milliUNIT(s)/Min IV Continuous <Continuous>  simethicone 80 milliGRAM(s) Chew every 4 hours PRN

## 2024-10-20 NOTE — PROGRESS NOTE ADULT - ASSESSMENT
A/P: 35yo  POD6 s/p pCS at 37w for arrest of descent c/b PEC w SF   -  Pain: PO motrin q6hrs, tylenol q8hrs, oxycodone for severe pain PRN  - PEC w/ SF (LFT, BP): s/p IV Mg x24hrs, BP normotensive to mild range overnight on labetalol 300 TID and Nifedipine 60/30, LFTs downtrending; continue to monitor closely  -  Post-operatively labs: post-op Hgb , hemodynamically stable, acute blood loss anemia, asymptomatic  -  GI: tolerating regular diet, passing gas  -  : s/p gonzalez , urinating without difficulty  -  DVT prophylaxis: encouraged increased ambulation, SCDs, SQL  -  Dispo: once stabilized 
35 yo female with no significant PMH who was admitted for induction of labor and is now s/p  10/13. Cardiology was consulted for pre-eclampsia, course c/b worsening LFTs with c/f severe features    # Pre-eclampsia with severe features  - SBP goal <140  - C/w Labetalol 300mg Q8H. Can up-titrate to 400mg TID as needed. (max dose 2400mg in 24 hr)  - C/w Nifedipine 60mg daytime at 30mg nighttime  - F/u TTE  - Check EKG    Please arrange follow up with Dr. Millicent Dhaliwal outpatient for continued monitoring.     Please reach out with further questions. Cardiology will continue to follow.     
A/P  34y s/p primary CS for second stage arrest, POD# 5, stable, meeting postpartum milestones   - POD1 Hgb 9.2 (preop 11.4); asymptomatic; will start PO iron while in house  - Preeclampsia: without severe features; normotensive to mild range overnight; denies toxic symptoms; continue VS q4h; continue labetalol 300 TID and nifedipine 30 QD, uptitrate as needed but seemingly better control given addition of nifedipine  - Pain: well controlled on analgesics as above; required oxycodone this morning  - GI: Tolerating regular diet  - : urinating without difficulty/pain  - DVT prophylaxis: ambulating frequently; Lovenox  - Dispo: Pending attending approval
34y  s/p primary  section, POD #1, stable  - Given urine output, stable vital signs, and nonedematous physical exam, 500cc LR bolus ordered  - Patient encouraged to continue PO hydration  - Will reassess urine output in 2 hours      Esme Preciado PA-C
A/P  34y s/p primary CS for second stage arrest complicated by preeclampsia without severe features, POD# 1, stable, meeting postpartum milestones   - f/u POD1 CBC  - PEC: normotensive to low mild range BPs overnight; denies toxic symptoms; continue VS q4h  - Pain: well controlled on analgesics as above  - GI: Tolerating regular diet  - : gonzalez to come out POD1; f/u TOV  - DVT prophylaxis: ambulating encouraged; Lovenox; SCDs  - Dispo: PPD 2, unless otherwise specified    
A&P:  34y  s/p primary CS at 37w for second stage arrest complicated by preeclampsia with severe features being liver enzymnes  No toxic signs or symptoms currently. No severe range BP.  Antihypertensives: Nifedipine 60mg QAM, 30mg QPM; Labetalol 300mg TID  Mg level is 4.6    - Continue IV Magnesium @2g/hr until 2130 on 10/19  - Continue to monitor blood pressures  - Follow up on Magnesium serum levels. Next Magnesium check at 0930  - GI: Regular diet  - : Strict I&Os  - Pain: well controlled on meds as above  - VTE: SCDs; Lovenox  - Dispo: [facility] [place]        
A/P  34y s/p primary CS for second stage arrest, POD# 2, stable, meeting postpartum milestones   - POD1 Hgb 9.2 (preop 11.4); asymptomatic; will start PO iron while in house  - Preeclampsia: without severe features; normotensive overnight; denies toxic symptoms; continue VS q4h  - Pain: well controlled on analgesics as above  - GI: Tolerating regular diet  - : urinating without difficulty/pain  - DVT prophylaxis: ambulating frequently; Lovenox  - Dispo: PPD 2, unless otherwise specified    
A/P  34y s/p primary CS for second stage arrest, POD# 3, stable, meeting postpartum milestones   - POD1 Hgb 9.2 (preop 11.4); asymptomatic; will start PO iron while in house  - Preeclampsia: without severe features; normotensive overnight; denies toxic symptoms; continue VS q4h  - Pain: well controlled on analgesics as above; required oxycodone this morning  - GI: Tolerating regular diet  - : urinating without difficulty/pain  - DVT prophylaxis: ambulating frequently; Lovenox  - Dispo: Pending attending approval
A/P  34y s/p primary CS for second stage arrest, POD# 3, stable, meeting postpartum milestones   - POD1 Hgb 9.2 (preop 11.4); asymptomatic; will start PO iron while in house  - Preeclampsia: without severe features; persistently mild range overnight; denies toxic symptoms; continue VS q4h; continue labetalol 200 TID, uptitrate as needed  - Pain: well controlled on analgesics as above; required oxycodone this morning  - GI: Tolerating regular diet  - : urinating without difficulty/pain  - DVT prophylaxis: ambulating frequently; Lovenox  - Dispo: Pending attending approval

## 2024-11-18 ENCOUNTER — TRANSCRIPTION ENCOUNTER (OUTPATIENT)
Age: 34
End: 2024-11-18